# Patient Record
Sex: FEMALE | Race: WHITE | Employment: UNEMPLOYED | RURAL
[De-identification: names, ages, dates, MRNs, and addresses within clinical notes are randomized per-mention and may not be internally consistent; named-entity substitution may affect disease eponyms.]

---

## 2017-06-06 ENCOUNTER — OFFICE VISIT (OUTPATIENT)
Dept: FAMILY MEDICINE CLINIC | Age: 32
End: 2017-06-06

## 2017-06-06 VITALS
RESPIRATION RATE: 18 BRPM | HEIGHT: 65 IN | SYSTOLIC BLOOD PRESSURE: 119 MMHG | OXYGEN SATURATION: 99 % | HEART RATE: 85 BPM | WEIGHT: 112.6 LBS | TEMPERATURE: 98.1 F | DIASTOLIC BLOOD PRESSURE: 76 MMHG | BODY MASS INDEX: 18.76 KG/M2

## 2017-06-06 DIAGNOSIS — F11.23 OPIOID DEPENDENCE WITH WITHDRAWAL (HCC): Primary | ICD-10-CM

## 2017-06-06 RX ORDER — DICYCLOMINE HYDROCHLORIDE 10 MG/1
10 CAPSULE ORAL
Qty: 30 CAP | Refills: 0 | Status: SHIPPED | OUTPATIENT
Start: 2017-06-06 | End: 2017-08-18

## 2017-06-06 RX ORDER — CLONIDINE HYDROCHLORIDE 0.1 MG/1
0.1 TABLET ORAL
Qty: 30 TAB | Refills: 0 | Status: SHIPPED | OUTPATIENT
Start: 2017-06-06 | End: 2017-08-18

## 2017-06-06 RX ORDER — ST. JOHN'S WORT 300 MG
TABLET ORAL
COMMUNITY
End: 2022-08-30

## 2017-06-06 NOTE — PATIENT INSTRUCTIONS
For symptomatic relief:   - Diarrhea: may take Imodium 4 mg orally followed by 2 mg after each loose stool (maximum 16 mg daily)   - Abdominal cramping: may take Bentyl 10 mg every 6 hours as needed     Clonidine 0.1 mg tablet - may take every 6 hours as needed for withdrawal symptoms   - This is a blood pressure medication and therefore SHOULD NOT BE USED IF BLOOD PRESSURE IS LESS THAN 85/55 OR HEART RATE IS LESS THAN 60    You should contact [address] about their Adult Substance Abuse Program -   Intake Services: [phone]  Monday - Friday 8:30 a.m. - 5:00 p.m. CALL 911 or GO TO YOUR NEAREST EMERGENCY DEPARTMENT IF YOU ARE UNABLE TO MANAGE WITHDRAWAL SYMPTOMS AT HOME    Opioid Withdrawal: Care Instructions  Your Care Instructions  Opioids are strong pain medicines. Examples include hydrocodone, oxycodone, fentanyl, and morphine. Heroin is an example of an illegal opioid. Your body gets used to this type of drug if you take it all the time. This is called being dependent on the drug. And when you stop taking it, you go through withdrawal.  Withdrawal symptoms can include nausea, sweating, chills, diarrhea, stomach cramps, and muscle aches. Withdrawal can last up to several weeks, depending on which drug you took. You may feel very ill, but you are probably not in medical danger. Withdrawal isn't easy, but there are things you can do to help you cope with the symptoms. You will feel a little bit better each day as your body adjusts and heals itself. Follow-up care is a key part of your treatment and safety. Be sure to make and go to all appointments, and call your doctor if you are having problems. It's also a good idea to know your test results and keep a list of the medicines you take. How can you care for yourself at home? · Your doctor may give you medicine to help you feel better. Read and follow all instructions on the label.   · To help get through withdrawal, you can also:  ¨ Get plenty of rest.  ¨ Drink plenty of fluids. ¨ Stay active, but don't tire yourself. ¨ Eat a healthy diet. · Do not drink alcohol or take illegal drugs. · Talk to your doctor about drug counseling programs to help you stay drug-free. When should you call for help? Call 911 anytime you think you may need emergency care. For example, call if:  · You feel you cannot stop from hurting yourself or someone else. Call your doctor now or seek immediate medical care if:  · You have new or worse withdrawal symptoms that you can't manage at home, such as:  ¨ Stomach cramps. ¨ Vomiting. ¨ Diarrhea. ¨ Muscle aches. ¨ Sweating. Watch closely for changes in your health, and be sure to contact your doctor if:  · You do not get better as expected. Where can you learn more? Go to http://tammie-meredith.info/. Enter E242 in the search box to learn more about \"Opioid Withdrawal: Care Instructions. \"  Current as of: November 16, 2016  Content Version: 11.2  © 4033-8243 Healthwise, Incorporated. Care instructions adapted under license by [employer] (which disclaims liability or warranty for this information). If you have questions about a medical condition or this instruction, always ask your healthcare professional. [address] any warranty or liability for your use of this information.

## 2017-06-06 NOTE — PROGRESS NOTES
Subjective:      Rc Villegas is a 32 y.o. female here \"with withdrawal\". Reports the use of Percocet \"for years\" which was started after a surgical procedure. She reports that she has been getting medication from a family friend. She has been taking up to 6 tablets of Percocet  mg daily. Over the past few days, she has been tapering herself down. She has had 1.5 tablets of Percocet  mg today and this is the last of the medication that she has. She reports that she is ready to stop all together. She has has previously stopped medication in the past and had to do her withdrawal process at home. She reports that she has been told that she is not a candidate for methadone and has been told that she does not meet criteria for inpatient detoxification. She reports that she is sad, emotional, and has had loose stools. Current Outpatient Prescriptions   Medication Sig Dispense Refill    carli's wort 300 mg tab Take  by mouth.  multivitamin (ONE A DAY) tablet Take 1 Tab by mouth daily. Allergies   Allergen Reactions    Keflex [Cephalexin] Other (comments)     Mini seizure. Past Medical History:   Diagnosis Date    Anxiety     Bipolar affect, depressed     Eating disorder, anorexia nervosa 2006    Has been hospitalized for this in the past    Fibromyalgia     IBS (irritable bowel syndrome)     Kidney stones     Opiate addiction ([address] 2015    Per [facility] - Went to pain clinic    PTSD (post-traumatic stress disorder) 2015    Dx at [facility] 2015       Social History   Substance Use Topics    Smoking status: Former Smoker     Quit date: 2/15/2006    Smokeless tobacco: Never Used    Alcohol use No        Review of Systems  Pertinent items are noted in HPI.      Objective:     Visit Vitals    /76 (BP 1 Location: Left arm, BP Patient Position: Sitting)    Pulse 85    Temp 98.1 °F (36.7 °C) (Oral)    Resp 18    Ht 5' 5\" (1.651 m)    Wt 112 lb 9.6 oz (51.1 kg)    LMP 04/07/2017    SpO2 99%    BMI 18.74 kg/m2      General appearance - alert, tearful, no acute distress   Eyes - pupils equal and reactive, extraocular eye movements intact, sclera anicteric  Oropharyngx - mucous membranes moist, pharynx normal without lesions  Chest - clear to auscultation, no wheezes, rales or rhonchi, symmetric air entry, no tachypnea, retractions or cyanosis  Heart - normal rate, regular rhythm, normal S1, S2, no murmurs, rubs, clicks or gallops  Neurological - alert, oriented, normal speech, no focal findings or movement disorder noted    Assessment/Plan:   Danna Yost is a 32 y.o. female seen for:     1. Opioid dependence with withdrawal [facility]): has taken the last of her reported pills today. She has been through withdrawal process in the past per her report. Discussed that she needs to be seen by a substance abuse provider and information for [facility] provided. In the interim, will help with symptom control with Bentryl for abdominal cramping, Imodium for loose stools. Discussed use of clonidine for anxiety - advised that medication should not be used if BP < 88/55 or HR <60. Signs/symtpoms that would warrant ED evaluation were discussed. - cloNIDine HCl (CATAPRES) 0.1 mg tablet; Take 1 Tab by mouth every six (6) hours as needed. For withdrawal symptoms. Dispense: 30 Tab; Refill: 0  - dicyclomine (BENTYL) 10 mg capsule; Take 1 Cap by mouth four (4) times daily as needed. For abdominal cramping. Dispense: 30 Cap; Refill: 0    I have discussed the diagnosis with the patient and the intended plan as seen in the above orders. The patient has received an after-visit summary and questions were answered concerning future plans. I have discussed medication side effects and warnings with the patient as well. Patient verbalizes understanding of plan of care and denies further questions or concerns at this time.  Informed patient to return to the office if symptoms worsen or if new symptoms arise. Follow-up Disposition:  Return in about 2 days (around 6/8/2017) for follow up.

## 2017-06-06 NOTE — PROGRESS NOTES
Chief Complaint   Patient presents with    Anxiety     pt is wanting to wean herself off of Percocet.  Drug Problem     pt states she has been taking Percocet 60 mg daily. has been trying to taper down. last 3 days she changed to 10mg daily and today 15mg. \"REVIEWED RECORD IN PREPARATION FOR VISIT AND HAVE OBTAINED THE NECESSARY DOCUMENTATION\"  1. Have you been to the ER, urgent care clinic since your last visit? Hospitalized since your last visit? No    2. Have you seen or consulted any other health care providers outside of the [facility] since your last visit? Include any pap smears or colon screening. No  Patient does not have advanced directives.

## 2017-06-06 NOTE — MR AVS SNAPSHOT
Visit Information Date & Time Provider Department Dept. Phone Encounter #  
 6/6/2017  1:45 PM Hali JacksonAlfonso [phone] [id number] Follow-up Instructions Return in about 2 days (around 6/8/2017) for follow up. Upcoming Health Maintenance Date Due DTaP/Tdap/Td series (1 - Tdap) 11/16/2006 PAP AKA CERVICAL CYTOLOGY 11/16/2006 INFLUENZA AGE 9 TO ADULT 8/1/2017 Allergies as of 6/6/2017  Review Complete On: 6/6/2017 By: Hali Jackson MD  
  
 Severity Noted Reaction Type Reactions Keflex [Cephalexin] High 02/15/2010    Other (comments) Mini seizure. Current Immunizations  Never Reviewed No immunizations on file. Not reviewed this visit You Were Diagnosed With   
  
 Codes Comments [facility]io[facility]    -  Primary ICD-10-CM: F11.23 
ICD-9-CM: 292.0, 304.00 Vitals BP Pulse Temp Resp Height(growth percentile) Weight(growth percentile) 119/76 (BP 1 Location: Left arm, BP Patient Position: Sitting) 85 98.1 °F (36.7 °C) (Oral) 18 5' 5\" (1.651 m) 112 lb 9.6 oz (51.1 kg) LMP SpO2 BMI OB Status Smoking Status 04/07/2017 99% 18.74 kg/m2 Unknown Former Smoker BMI and BSA Data Body Mass Index Body Surface Area 18.74 kg/m 2 1.53 m 2 Preferred Pharmacy Pharmacy Name Phone [facility] [address] [place] [address] [address] [facility] [phone] Your Updated Medication List  
  
   
This list is accurate as of: 6/6/17  2:29 PM.  Always use your most recent med list.  
  
  
  
  
 cloNIDine HCl 0.1 mg tablet Commonly known as:  CATAPRES Take 1 Tab by mouth every six (6) hours as needed. For withdrawal symptoms. dicyclomine 10 mg capsule Commonly known as:  BENTYL Take 1 Cap by mouth four (4) times daily as needed. For abdominal cramping.  
  
 multivitamin tablet Commonly known as:  ONE A DAY Take 1 Tab by mouth daily. carli's wort 300 mg Tab Take  by mouth. Prescriptions Sent to Pharmacy Refills  
 cloNIDine HCl (CATAPRES) 0.1 mg tablet 0 Sig: Take 1 Tab by mouth every six (6) hours as needed. For withdrawal symptoms. Class: Normal  
 Pharmacy: [facility] [address] [place] - [address] AT [facility] of [address] ([facility] Ph #: [phone] Route: Oral  
 dicyclomine (BENTYL) 10 mg capsule 0 Sig: Take 1 Cap by mouth four (4) times daily as needed. For abdominal cramping. Class: Normal  
 Pharmacy: [facility] [address] [place] - [address] AT [facility] of [address] ([facility] Ph #: [phone] Route: Oral  
  
Follow-up Instructions Return in about 2 days (around 6/8/2017) for follow up. Patient Instructions For symptomatic relief: - Diarrhea: may take Imodium 4 mg orally followed by 2 mg after each loose stool (maximum 16 mg daily) - Abdominal cramping: may take Bentyl 10 mg every 6 hours as needed Clonidine 0.1 mg tablet - may take every 6 hours as needed for withdrawal symptoms - This is a blood pressure medication and therefore SHOULD NOT BE USED IF BLOOD PRESSURE IS LESS THAN 85/55 OR HEART RATE IS LESS THAN 60 You should contact [address] [facility] [address] about their Adult Substance Abuse Program - Intake Services: [phone] Monday  Friday 8:30 a.m.  5:00 p.m. CALL 911 or GO TO YOUR NEAREST EMERGENCY DEPARTMENT IF YOU ARE UNABLE TO MANAGE WITHDRAWAL SYMPTOMS AT HOME Opioid Withdrawal: Care Instructions Your Care Instructions Opioids are strong pain medicines. Examples include hydrocodone, oxycodone, fentanyl, and morphine. Heroin is an example of an illegal opioid. Your body gets used to this type of drug if you take it all the time. This is called being dependent on the drug.  And when you stop taking it, you go through withdrawal. 
Withdrawal symptoms can include nausea, sweating, chills, diarrhea, stomach cramps, and muscle aches. Withdrawal can last up to several weeks, depending on which drug you took. You may feel very ill, but you are probably not in medical danger. Withdrawal isn't easy, but there are things you can do to help you cope with the symptoms. You will feel a little bit better each day as your body adjusts and heals itself. Follow-up care is a key part of your treatment and safety. Be sure to make and go to all appointments, and call your doctor if you are having problems. It's also a good idea to know your test results and keep a list of the medicines you take. How can you care for yourself at home? · Your doctor may give you medicine to help you feel better. Read and follow all instructions on the label. · To help get through withdrawal, you can also: ¨ Get plenty of rest. 
¨ Drink plenty of fluids. ¨ Stay active, but don't tire yourself. ¨ Eat a healthy diet. · Do not drink alcohol or take illegal drugs. · Talk to your doctor about drug counseling programs to help you stay drug-free. When should you call for help? Call 911 anytime you think you may need emergency care. For example, call if: 
· You feel you cannot stop from hurting yourself or someone else. Call your doctor now or seek immediate medical care if: 
· You have new or worse withdrawal symptoms that you can't manage at home, such as: ¨ Stomach cramps. ¨ Vomiting. ¨ Diarrhea. ¨ Muscle aches. ¨ Sweating. Watch closely for changes in your health, and be sure to contact your doctor if: 
· You do not get better as expected. Where can you learn more? Go to http://tammie-meredith.info/. Enter E242 in the search box to learn more about \"Opioid Withdrawal: Care Instructions. \" Current as of: November 16, 2016 Content Version: 11.2 © 8420-1759 [employer].  Care instructions adapted under license by [address] (which disclaims liability or warranty for this information). If you have questions about a medical condition or this instruction, always ask your healthcare professional. [address] any warranty or liability for your use of this information. Introducing [facility]! [facility] introduces [employer] patient portal. Now you can access parts of your medical record, email your doctor's office, and request medication refills online. 1. In your internet browser, go to [email or web address] 2. Click on the First Time User? Click Here link in the Sign In box. You will see the New Member Sign Up page. 3. Enter your [employer] Access Code exactly as it appears below. You will not need to use this code after youve completed the sign-up process. If you do not sign up before the expiration date, you must request a new code. · [employer] Access Code: [email or web address] Expires: 9/4/2017  2:29 PM 
 
4. Enter the last four digits of your Social Security Number (xxxx) and Date of Birth (mm/dd/yyyy) as indicated and click Submit. You will be taken to the next sign-up page. 5. Create a Color Eight ID. This will be your Color Eight login ID and cannot be changed, so think of one that is secure and easy to remember. 6. Create a [employer] password. You can change your password at any time. 7. Enter your Password Reset Question and Answer. This can be used at a later time if you forget your password. 8. Enter your e-mail address. You will receive e-mail notification when new information is available in [address] 9. Click Sign Up. You can now view and download portions of your medical record. 10. Click the Download Summary menu link to download a portable copy of your medical information. If you have questions, please visit the Frequently Asked Questions section of the [employer] website.  Remember, [employer] is NOT to be used for urgent needs. For medical emergencies, dial 911. Now available from your iPhone and Android! Please provide this summary of care documentation to your next provider. Your primary care clinician is listed as [address]. If you have any questions after today's visit, please call [phone].

## 2017-08-18 ENCOUNTER — OFFICE VISIT (OUTPATIENT)
Dept: FAMILY MEDICINE CLINIC | Age: 32
End: 2017-08-18

## 2017-08-18 VITALS
TEMPERATURE: 98.7 F | WEIGHT: 113 LBS | DIASTOLIC BLOOD PRESSURE: 88 MMHG | HEIGHT: 65 IN | BODY MASS INDEX: 18.83 KG/M2 | OXYGEN SATURATION: 100 % | RESPIRATION RATE: 18 BRPM | SYSTOLIC BLOOD PRESSURE: 125 MMHG | HEART RATE: 56 BPM

## 2017-08-18 DIAGNOSIS — Z00.00 ROUTINE MEDICAL EXAM: Primary | ICD-10-CM

## 2017-08-18 DIAGNOSIS — F11.21 OPIOID DEPENDENCE IN EARLY, EARLY PARTIAL, SUSTAINED FULL, OR SUSTAINED PARTIAL REMISSION (HCC): ICD-10-CM

## 2017-08-18 DIAGNOSIS — N91.2 AMENORRHEA: ICD-10-CM

## 2017-08-18 DIAGNOSIS — F41.9 ANXIETY: ICD-10-CM

## 2017-08-18 RX ORDER — HYDROXYZINE 50 MG/1
50 TABLET, FILM COATED ORAL
Qty: 90 TAB | Refills: 1 | Status: SHIPPED | OUTPATIENT
Start: 2017-08-18 | End: 2020-08-18 | Stop reason: ALTCHOICE

## 2017-08-18 NOTE — PROGRESS NOTES
Subjective:      Libby Ward is a 32 y.o. female here today for her annual physical exam.     Health Maintenance:  · Cervical cancer screening: previously done at [facility]. · Tetanus: unknown    Additional concerns:   - Opioid dependency: She is in outpatient therapy through [facility]. She goes to group once per week. She goes to substance abuse counselor at least 1-2 times per week. She reports that she did use 1/2 Percocet last week. She reports that she is having difficulty with dealing the the stress and anxiety of living with the pain. She has taken hydroxyzine in the past with benefit. No LMP recorded. Current Outpatient Prescriptions   Medication Sig Dispense Refill    carli's wort 300 mg tab Take  by mouth.  multivitamin (ONE A DAY) tablet Take 1 Tab by mouth daily. Allergies   Allergen Reactions    Keflex [Cephalexin] Other (comments)     Mini seizure.         Past Medical History:   Diagnosis Date    Anxiety     Bipolar affect, depressed     Eating disorder, anorexia nervosa 2006    Has been hospitalized for this in the past    Fibromyalgia     IBS (irritable bowel syndrome)     Kidney stones     [facility]ate [facility] ([facility] [address] 2015    Per [facility] - Went to pain clinic    PTSD (post-traumatic stress disorder) 2015    Dx at [facility] 2015       Social History   Substance Use Topics    Smoking status: Former Smoker     Quit date: 2/15/2006    Smokeless tobacco: Never Used    Alcohol use No        Review of Systems  Constitutional: negative for fevers and chills  Eyes: negative for visual disturbance and irritation  Ears, nose, mouth, throat, and face: negative for nasal congestion and sore throat  Respiratory: negative for cough, sputum or dyspnea on exertion  Cardiovascular: negative for chest pain, chest pressure/discomfort, palpitations, irregular heart beats, lower extremity edema  Gastrointestinal: negative for nausea, vomiting, change in bowel habits and abdominal pain  Genitourinary:negative for frequency, dysuria and hematuria  Neurological: negative for headaches, dizziness and paresthesia     Objective:     Visit Vitals    /88 (BP 1 Location: Right arm, BP Patient Position: Sitting)    Pulse (!) 56    Temp 98.7 °F (37.1 °C) (Oral)    Resp 18    Ht 5' 5\" (1.651 m)    Wt 113 lb (51.3 kg)    SpO2 100%    BMI 18.8 kg/m2      General appearance - alert, well appearing, and in no distress  Eyes - pupils equal and reactive, extraocular eye movements intact, sclera anicteric  Oropharyngx - mucous membranes moist, pharynx normal without lesions  Neck - supple, no significant adenopathy, thyroid exam: thyroid is normal in size without nodules or tenderness  Chest - clear to auscultation, no wheezes, rales or rhonchi, symmetric air entry, no tachypnea, retractions or cyanosis  Heart - normal rate, regular rhythm, normal S1, S2, no murmurs, rubs, clicks or gallops  Abdomen - soft, nontender, nondistended, no masses or organomegaly  Neurological - alert, oriented, normal speech, no focal findings or movement disorder noted  Extremities - peripheral pulses normal, no pedal edema, no clubbing or cyanosis      Assessment/Plan:   Shira Beal is a 32 y.o. female seen for:     1. Routine medical exam: healthy. Routine gynecological care provided at [address] [place] [address] and she is encouraged to follow up. 2. Anxiety: will start hydroxyzine therapy. Feel as though she needs Psychiatry evaluation and hopefully she will be able to be seen at [facility]. - hydrOXYzine HCl (ATARAX) 50 mg tablet; Take 1 Tab by mouth three (3) times daily as needed for Anxiety. Dispense: 90 Tab; Refill: 1    3. Opioid dependence in early, early partial, sustained full, or sustained partial remission [facility]): currently in outpatient substance abuse therapy through [facility]. 4. Amenorrhea: reports full work up with her Gynecologist whom she is encouraged to see.     I have discussed the diagnosis with the patient and the intended plan as seen in the above orders. The patient has received an after-visit summary and questions were answered concerning future plans. I have discussed medication side effects and warnings with the patient as well. Patient verbalizes understanding of plan of care and denies further questions or concerns at this time. Informed patient to return to the office if symptoms worsen or if new symptoms arise. Follow-up Disposition:  Return if symptoms worsen or fail to improve.

## 2017-08-18 NOTE — PATIENT INSTRUCTIONS

## 2017-08-18 NOTE — PROGRESS NOTES
Identified pt with two pt identifiers(name and ). Chief Complaint   Patient presents with    Medication Problem     Addicted to percecet Off for two months still going through withdrawl and pain. broke down and had a 1/2 pill last week. Wants help    Menstrual Problem     Had first cycle in a year 2 months sgo. Hasnt had one since. Health Maintenance Due   Topic    DTaP/Tdap/Td series (1 - Tdap)    PAP AKA CERVICAL CYTOLOGY     INFLUENZA AGE 9 TO ADULT        Wt Readings from Last 3 Encounters:   17 113 lb (51.3 kg)   17 112 lb 9.6 oz (51.1 kg)   16 111 lb (50.3 kg)     Temp Readings from Last 3 Encounters:   17 98.7 °F (37.1 °C) (Oral)   17 98.1 °F (36.7 °C) (Oral)   16 98.2 °F (36.8 °C) (Oral)     BP Readings from Last 3 Encounters:   17 125/88   17 119/76   16 103/67     Pulse Readings from Last 3 Encounters:   17 (!) 56   17 85   16 65         Learning Assessment:  :     Learning Assessment 4/15/2016   PRIMARY LEARNER Patient   HIGHEST LEVEL OF EDUCATION - PRIMARY LEARNER  GRADUATED HIGH SCHOOL OR GED   BARRIERS PRIMARY LEARNER NONE   CO-LEARNER CAREGIVER No   PRIMARY LANGUAGE ENGLISH   LEARNER PREFERENCE PRIMARY DEMONSTRATION     READING     LISTENING   ANSWERED BY patient   RELATIONSHIP SELF       Depression Screening:  :     PHQ over the last two weeks 2017   Little interest or pleasure in doing things Not at all   Feeling down, depressed or hopeless Not at all   Total Score PHQ 2 0       Fall Risk Assessment:  :     No flowsheet data found. Abuse Screening:  :     Abuse Screening Questionnaire 2017   Do you ever feel afraid of your partner? N   Are you in a relationship with someone who physically or mentally threatens you? N   Is it safe for you to go home?  Y       Coordination of Care Questionnaire:  :     1) Have you been to an emergency room, urgent care clinic since your last visit? no   Hospitalized since your last visit? no             2) Have you seen or consulted any other health care providers outside of [address] [place] [address] since your last visit? yes [address]   (Include any pap smears or colon screenings in this section.)    3) Do you have an Advance Directive on file? no  Are you interested in receiving information about Advance Directives? no    Reviewed record in preparation for visit and have obtained necessary documentation. Medication reconciliation up to date and corrected with patient at this time.

## 2017-08-18 NOTE — MR AVS SNAPSHOT
Visit Information Date & Time Provider Department Dept. Phone Encounter #  
 8/18/2017  8:15 AM Noelle SarahAlfonso [phone] [id number] Follow-up Instructions Return if symptoms worsen or fail to improve. Upcoming Health Maintenance Date Due DTaP/Tdap/Td series (1 - Tdap) 11/16/2006 PAP AKA CERVICAL CYTOLOGY 11/16/2006 INFLUENZA AGE 9 TO ADULT 8/1/2017 Allergies as of 8/18/2017  Review Complete On: 8/18/2017 By: Noelle Sarah MD  
  
 Severity Noted Reaction Type Reactions Keflex [Cephalexin] High 02/15/2010    Other (comments) Mini seizure. Current Immunizations  Never Reviewed No immunizations on file. Not reviewed this visit You Were Diagnosed With   
  
 Codes Comments Routine medical exam    -  Primary ICD-10-CM: Z00.00 ICD-9-CM: V70.0 Anxiety     ICD-10-CM: F41.9 ICD-9-CM: 300.00 Opioid dependence in early, early partial, sustained full, or sustained partial remission ([facility]     ICD-10-CM: F11.21 
ICD-9-CM: 304.03 Amenorrhea     ICD-10-CM: N91.2 ICD-9-CM: 626.0 Vitals BP Pulse Temp Resp Height(growth percentile) Weight(growth percentile) 125/88 (BP 1 Location: Right arm, BP Patient Position: Sitting) (!) 56 98.7 °F (37.1 °C) (Oral) 18 5' 5\" (1.651 m) 113 lb (51.3 kg) SpO2 BMI OB Status Smoking Status 100% 18.8 kg/m2 Unknown Former Smoker BMI and BSA Data Body Mass Index Body Surface Area  
 18.8 kg/m 2 1.53 m 2 Preferred Pharmacy Pharmacy Name Phone [facility] [address] AT [address] [facility] [phone] Your Updated Medication List  
  
   
This list is accurate as of: 8/18/17  9:06 AM.  Always use your most recent med list.  
  
  
  
  
 hydrOXYzine HCl 50 mg tablet Commonly known as:  ATARAX Take 1 Tab by mouth three (3) times daily as needed for Anxiety. multivitamin tablet Commonly known as:  ONE A DAY Take 1 Tab by mouth daily. carli's wort 300 mg Tab Take  by mouth. Prescriptions Sent to Pharmacy Refills  
 hydrOXYzine HCl (ATARAX) 50 mg tablet 1 Sig: Take 1 Tab by mouth three (3) times daily as needed for Anxiety. Class: Normal  
 Pharmacy: [facility]  [address], [place] - [address] AT [facility] [address] ([facility] #: [phone] Route: Oral  
  
Follow-up Instructions Return if symptoms worsen or fail to improve. Patient Instructions Anxiety Disorder: Care Instructions Your Care Instructions Anxiety is a normal reaction to stress. Difficult situations can cause you to have symptoms such as sweaty palms and a nervous feeling. In an anxiety disorder, the symptoms are far more severe. Constant worry, muscle tension, trouble sleeping, nausea and diarrhea, and other symptoms can make normal daily activities difficult or impossible. These symptoms may occur for no reason, and they can affect your work, school, or social life. Medicines, counseling, and self-care can all help. Follow-up care is a key part of your treatment and safety. Be sure to make and go to all appointments, and call your doctor if you are having problems. It's also a good idea to know your test results and keep a list of the medicines you take. How can you care for yourself at home? · Take medicines exactly as directed. Call your doctor if you think you are having a problem with your medicine. · Go to your counseling sessions and follow-up appointments. · Recognize and accept your anxiety. Then, when you are in a situation that makes you anxious, say to yourself, \"This is not an emergency. I feel uncomfortable, but I am not in danger. I can keep going even if I feel anxious. \" · Be kind to your body: ¨ Relieve tension with exercise or a massage.  
¨ Get enough rest. 
 ¨ Avoid alcohol, caffeine, nicotine, and illegal drugs. They can increase your anxiety level and cause sleep problems. ¨ Learn and do relaxation techniques. See below for more about these techniques. · Engage your mind. Get out and do something you enjoy. Go to a funny movie, or take a walk or hike. Plan your day. Having too much or too little to do can make you anxious. · Keep a record of your symptoms. Discuss your fears with a good friend or family member, or join a support group for people with similar problems. Talking to others sometimes relieves stress. · Get involved in social groups, or volunteer to help others. Being alone sometimes makes things seem worse than they are. · Get at least 30 minutes of exercise on most days of the week to relieve stress. Walking is a good choice. You also may want to do other activities, such as running, swimming, cycling, or playing tennis or team sports. Relaxation techniques Do relaxation exercises 10 to 20 minutes a day. You can play soothing, relaxing music while you do them, if you wish. · Tell others in your house that you are going to do your relaxation exercises. Ask them not to disturb you. · Find a comfortable place, away from all distractions and noise. · Lie down on your back, or sit with your back straight. · Focus on your breathing. Make it slow and steady. · Breathe in through your nose. Breathe out through either your nose or mouth. · Breathe deeply, filling up the area between your navel and your rib cage. Breathe so that your belly goes up and down. · Do not hold your breath. · Breathe like this for 5 to 10 minutes. Notice the feeling of calmness throughout your whole body. As you continue to breathe slowly and deeply, relax by doing the following for another 5 to 10 minutes: · Tighten and relax each muscle group in your body. You can begin at your toes and work your way up to your head. · Imagine your muscle groups relaxing and becoming heavy. · Empty your mind of all thoughts. · Let yourself relax more and more deeply. · Become aware of the state of calmness that surrounds you. · When your relaxation time is over, you can bring yourself back to alertness by moving your fingers and toes and then your hands and feet and then stretching and moving your entire body. Sometimes people fall asleep during relaxation, but they usually wake up shortly afterward. · Always give yourself time to return to full alertness before you drive a car or do anything that might cause an accident if you are not fully alert. Never play a relaxation tape while you drive a car. When should you call for help? Call 911 anytime you think you may need emergency care. For example, call if: 
· You feel you cannot stop from hurting yourself or someone else. Keep the numbers for these national suicide hotlines: 8-738-574-TALK (5-211.954.4972) and 5-312-BGRKGLB (1-452.938.5627). If you or someone you know talks about suicide or feeling hopeless, get help right away. Watch closely for changes in your health, and be sure to contact your doctor if: 
· You have anxiety or fear that affects your life. · You have symptoms of anxiety that are new or different from those you had before. Where can you learn more? Go to http://tammie-meredith.info/. Enter P754 in the search box to learn more about \"Anxiety Disorder: Care Instructions. \" Current as of: July 26, 2016 Content Version: 11.3 © 6081-1833 Healthwise, Incorporated. Care instructions adapted under license by [employer] (which disclaims liability or warranty for this information). If you have questions about a medical condition or this instruction, always ask your healthcare professional. [address] any warranty or liability for your use of this information. Introducing \[facility]\"" & HEALTH SERVICES! César broderick yoonew patient portal. Now you can access parts of your medical record, email your doctor's office, and request medication refills online. 1. In your internet browser, go to [email or web address] 2. Click on the First Time User? Click Here link in the Sign In box. You will see the New Member Sign Up page. 3. Enter your yoonew Access Code exactly as it appears below. You will not need to use this code after youve completed the sign-up process. If you do not sign up before the expiration date, you must request a new code. · yoonew Access Code: [email or web address] Expires: 9/4/2017  2:29 PM 
 
4. Enter the last four digits of your Social Security Number (xxxx) and Date of Birth (mm/dd/yyyy) as indicated and click Submit. You will be taken to the next sign-up page. 5. Create a yoonew ID. This will be your yoonew login ID and cannot be changed, so think of one that is secure and easy to remember. 6. Create a yoonew password. You can change your password at any time. 7. Enter your Password Reset Question and Answer. This can be used at a later time if you forget your password. 8. Enter your e-mail address. You will receive e-mail notification when new information is available in [address] 9. Click Sign Up. You can now view and download portions of your medical record. 10. Click the Download Summary menu link to download a portable copy of your medical information. If you have questions, please visit the Frequently Asked Questions section of the yoonew website. Remember, yoonew is NOT to be used for urgent needs. For medical emergencies, dial 911. Now available from your iPhone and Android! Please provide this summary of care documentation to your next provider. Your primary care clinician is listed as [address]. If you have any questions after today's visit, please call [phone].

## 2018-01-19 PROCEDURE — 99283 EMERGENCY DEPT VISIT LOW MDM: CPT

## 2018-01-20 ENCOUNTER — HOSPITAL ENCOUNTER (EMERGENCY)
Age: 33
Discharge: HOME | End: 2018-01-20
Attending: EMERGENCY MEDICINE
Payer: SELF-PAY

## 2018-01-20 VITALS
HEART RATE: 77 BPM | SYSTOLIC BLOOD PRESSURE: 113 MMHG | OXYGEN SATURATION: 100 % | DIASTOLIC BLOOD PRESSURE: 66 MMHG | TEMPERATURE: 97.4 F | RESPIRATION RATE: 18 BRPM | HEIGHT: 65 IN | WEIGHT: 118.17 LBS | BODY MASS INDEX: 19.69 KG/M2

## 2018-01-20 DIAGNOSIS — F12.90 MARIJUANA USE, CONTINUOUS: ICD-10-CM

## 2018-01-20 DIAGNOSIS — R11.2 NAUSEA AND VOMITING IN ADULT: ICD-10-CM

## 2018-01-20 DIAGNOSIS — R10.13 ABDOMINAL PAIN, EPIGASTRIC: Primary | ICD-10-CM

## 2018-01-20 LAB
ALBUMIN SERPL-MCNC: 4.7 G/DL (ref 3.5–5)
ALBUMIN/GLOB SERPL: 1.4 {RATIO} (ref 1.1–2.2)
ALP SERPL-CCNC: 72 U/L (ref 45–117)
ALT SERPL-CCNC: 20 U/L (ref 12–78)
ANION GAP SERPL CALC-SCNC: 12 MMOL/L (ref 5–15)
APPEARANCE UR: ABNORMAL
AST SERPL-CCNC: 21 U/L (ref 15–37)
BACTERIA URNS QL MICRO: ABNORMAL /HPF
BASOPHILS # BLD: 0 K/UL (ref 0–0.1)
BASOPHILS NFR BLD: 0 % (ref 0–1)
BILIRUB SERPL-MCNC: 0.3 MG/DL (ref 0.2–1)
BILIRUB UR QL: NEGATIVE
BUN SERPL-MCNC: 13 MG/DL (ref 6–20)
BUN/CREAT SERPL: 17 (ref 12–20)
CALCIUM SERPL-MCNC: 9.7 MG/DL (ref 8.5–10.1)
CHLORIDE SERPL-SCNC: 99 MMOL/L (ref 97–108)
CO2 SERPL-SCNC: 28 MMOL/L (ref 21–32)
COLOR UR: ABNORMAL
CREAT SERPL-MCNC: 0.78 MG/DL (ref 0.55–1.02)
EOSINOPHIL # BLD: 0 K/UL (ref 0–0.4)
EOSINOPHIL NFR BLD: 0 % (ref 0–7)
EPITH CASTS URNS QL MICRO: ABNORMAL /LPF
ERYTHROCYTE [DISTWIDTH] IN BLOOD BY AUTOMATED COUNT: 12 % (ref 11.5–14.5)
GLOBULIN SER CALC-MCNC: 3.3 G/DL (ref 2–4)
GLUCOSE SERPL-MCNC: 132 MG/DL (ref 65–100)
GLUCOSE UR STRIP.AUTO-MCNC: NEGATIVE MG/DL
HCG UR QL: NEGATIVE
HCT VFR BLD AUTO: 37.3 % (ref 35–47)
HGB BLD-MCNC: 13.5 G/DL (ref 11.5–16)
HGB UR QL STRIP: NEGATIVE
KETONES UR QL STRIP.AUTO: 15 MG/DL
LEUKOCYTE ESTERASE UR QL STRIP.AUTO: ABNORMAL
LIPASE SERPL-CCNC: 152 U/L (ref 73–393)
LYMPHOCYTES # BLD: 1.2 K/UL (ref 0.8–3.5)
LYMPHOCYTES NFR BLD: 14 % (ref 12–49)
MAGNESIUM SERPL-MCNC: 1.7 MG/DL (ref 1.6–2.4)
MCH RBC QN AUTO: 30.4 PG (ref 26–34)
MCHC RBC AUTO-ENTMCNC: 36.2 G/DL (ref 30–36.5)
MCV RBC AUTO: 84 FL (ref 80–99)
MONOCYTES # BLD: 0.3 K/UL (ref 0–1)
MONOCYTES NFR BLD: 4 % (ref 5–13)
NEUTS SEG # BLD: 7.3 K/UL (ref 1.8–8)
NEUTS SEG NFR BLD: 82 % (ref 32–75)
NITRITE UR QL STRIP.AUTO: NEGATIVE
PH UR STRIP: 8.5 [PH] (ref 5–8)
PLATELET # BLD AUTO: 190 K/UL (ref 150–400)
POTASSIUM SERPL-SCNC: 3.3 MMOL/L (ref 3.5–5.1)
PROT SERPL-MCNC: 8 G/DL (ref 6.4–8.2)
PROT UR STRIP-MCNC: ABNORMAL MG/DL
RBC # BLD AUTO: 4.44 M/UL (ref 3.8–5.2)
RBC #/AREA URNS HPF: ABNORMAL /HPF (ref 0–5)
SODIUM SERPL-SCNC: 139 MMOL/L (ref 136–145)
UA: UC IF INDICATED,UAUC: ABNORMAL
UROBILINOGEN UR QL STRIP.AUTO: 0.2 EU/DL (ref 0.2–1)
WBC # BLD AUTO: 8.9 K/UL (ref 3.6–11)
WBC URNS QL MICRO: ABNORMAL /HPF (ref 0–4)

## 2018-01-20 PROCEDURE — 74011250636 HC RX REV CODE- 250/636: Performed by: EMERGENCY MEDICINE

## 2018-01-20 PROCEDURE — 80053 COMPREHEN METABOLIC PANEL: CPT | Performed by: PHYSICIAN ASSISTANT

## 2018-01-20 PROCEDURE — 96361 HYDRATE IV INFUSION ADD-ON: CPT

## 2018-01-20 PROCEDURE — 85025 COMPLETE CBC W/AUTO DIFF WBC: CPT | Performed by: PHYSICIAN ASSISTANT

## 2018-01-20 PROCEDURE — 74011000250 HC RX REV CODE- 250: Performed by: PHYSICIAN ASSISTANT

## 2018-01-20 PROCEDURE — 81025 URINE PREGNANCY TEST: CPT

## 2018-01-20 PROCEDURE — 81001 URINALYSIS AUTO W/SCOPE: CPT | Performed by: EMERGENCY MEDICINE

## 2018-01-20 PROCEDURE — 87086 URINE CULTURE/COLONY COUNT: CPT | Performed by: EMERGENCY MEDICINE

## 2018-01-20 PROCEDURE — 83690 ASSAY OF LIPASE: CPT | Performed by: PHYSICIAN ASSISTANT

## 2018-01-20 PROCEDURE — 96375 TX/PRO/DX INJ NEW DRUG ADDON: CPT

## 2018-01-20 PROCEDURE — 96374 THER/PROPH/DIAG INJ IV PUSH: CPT

## 2018-01-20 PROCEDURE — 83735 ASSAY OF MAGNESIUM: CPT | Performed by: PHYSICIAN ASSISTANT

## 2018-01-20 PROCEDURE — 74011250636 HC RX REV CODE- 250/636: Performed by: PHYSICIAN ASSISTANT

## 2018-01-20 RX ORDER — PROCHLORPERAZINE 25 MG
25 SUPPOSITORY, RECTAL RECTAL
Qty: 10 SUPPOSITORY | Refills: 0 | Status: SHIPPED | OUTPATIENT
Start: 2018-01-20 | End: 2018-01-27

## 2018-01-20 RX ORDER — DIPHENHYDRAMINE HYDROCHLORIDE 50 MG/ML
50 INJECTION, SOLUTION INTRAMUSCULAR; INTRAVENOUS
Status: COMPLETED | OUTPATIENT
Start: 2018-01-20 | End: 2018-01-20

## 2018-01-20 RX ORDER — FAMOTIDINE 20 MG/1
20 TABLET, FILM COATED ORAL 2 TIMES DAILY
Qty: 20 TAB | Refills: 0 | Status: SHIPPED | OUTPATIENT
Start: 2018-01-20 | End: 2020-08-18 | Stop reason: ALTCHOICE

## 2018-01-20 RX ORDER — PROCHLORPERAZINE EDISYLATE 5 MG/ML
10 INJECTION INTRAMUSCULAR; INTRAVENOUS
Status: COMPLETED | OUTPATIENT
Start: 2018-01-20 | End: 2018-01-20

## 2018-01-20 RX ORDER — ONDANSETRON 2 MG/ML
4 INJECTION INTRAMUSCULAR; INTRAVENOUS
Status: COMPLETED | OUTPATIENT
Start: 2018-01-20 | End: 2018-01-20

## 2018-01-20 RX ADMIN — SODIUM CHLORIDE 1000 ML: 9 INJECTION, SOLUTION INTRAVENOUS at 00:55

## 2018-01-20 RX ADMIN — DIPHENHYDRAMINE HYDROCHLORIDE 50 MG: 50 INJECTION, SOLUTION INTRAMUSCULAR; INTRAVENOUS at 00:55

## 2018-01-20 RX ADMIN — ONDANSETRON 4 MG: 2 INJECTION INTRAMUSCULAR; INTRAVENOUS at 00:41

## 2018-01-20 RX ADMIN — PROCHLORPERAZINE EDISYLATE 10 MG: 5 INJECTION INTRAMUSCULAR; INTRAVENOUS at 00:55

## 2018-01-20 RX ADMIN — FAMOTIDINE 20 MG: 10 INJECTION, SOLUTION INTRAVENOUS at 00:55

## 2018-01-20 NOTE — ED PROVIDER NOTES
HPI Comments: 28 y.o. female with past medical history significant for bipolar affect, anxiety, anorexia, fibromyalgia, IBS, kidney stones, PTSD, and opiate addiction who presents accompanied by her mother with chief complaint of abdominal pain. The pt c/o abdominal pain with associated nausea and vomiting. The pt indicates approximately 6 episodes of vomiting today. The pt says that her abdominal pain is so severe that she \"can't stand it\" and she has \"nothing left to vomit. \" She describes a horrible burning sensation that radiates into her chest and throat that is worse with vomiting. The pt admits that she had similar, less severe symptoms in the past. Denies diarrhea, fever, chills, urinary symptoms, and SOB. There are no other acute medical concerns at this time. Social hx: nonsmoker. Social ETOH use. Current, daily marijuana use. PCP: Ron Viramontes MD    Note written by Екатерина Byers, as dictated by Dima Jeff PA-C 12:35 AM     The history is provided by the patient, the spouse and a parent. No  was used.         Past Medical History:   Diagnosis Date    Anxiety     Bipolar affect, depressed     Eating disorder, anorexia nervosa 2006    Has been hospitalized for this in the past    Fibromyalgia     IBS (irritable bowel syndrome)     Kidney stones     Opiate addiction ([facility] [address] 2015    Per [facility] - Went to pain clinic    PTSD (post-traumatic stress disorder) 2015    Dx at [facility] 2015       Past Surgical History:   Procedure Laterality Date    HX LEEP PROCEDURE  2015    precancerous cells    HX TONSIL AND ADENOIDECTOMY      HX WISDOM TEETH EXTRACTION           Family History:   Problem Relation Age of Onset    Psychiatric Disorder Mother     Elevated Lipids Mother     Psychiatric Disorder Sister     SLE Paternal Grandmother        Social History     Social History    Marital status:      Spouse name: N/A    Number of children: N/A    Years of education: N/A Occupational History    Not on file. Social History Main Topics    Smoking status: Former Smoker     Quit date: 2/15/2006    Smokeless tobacco: Never Used    Alcohol use No    Drug use: No      Comment: Has used marijuana in the past    Sexual activity: Yes     Partners: Male     Birth control/ protection: None     Other Topics Concern    Not on file     Social History Narrative         ALLERGIES: Keflex [cephalexin]    Review of Systems   Constitutional: Negative for appetite change, chills, fatigue and fever. HENT: Negative for congestion, ear pain, postnasal drip, rhinorrhea and sore throat. Eyes: Negative for visual disturbance. Respiratory: Negative for cough, shortness of breath and wheezing. Cardiovascular: Negative for chest pain, palpitations and leg swelling. Gastrointestinal: Positive for abdominal pain, nausea and vomiting. Negative for anal bleeding, constipation and diarrhea. Genitourinary: Negative for dysuria and hematuria. Musculoskeletal: Negative for arthralgias and myalgias. Skin: Negative for rash. Allergic/Immunologic: Negative for immunocompromised state. Neurological: Negative for weakness, light-headedness and headaches. Vitals:    01/19/18 2324 01/20/18 0348   BP: 146/75 113/66   Pulse: 77    Resp: 18    Temp: 97.4 °F (36.3 °C)    SpO2: 100%    Weight: 53.6 kg (118 lb 2.7 oz)    Height: 5' 5\" (1.651 m)             Physical Exam   Constitutional: She is oriented to person, place, and time. She appears well-developed and well-nourished. No distress. Ill, but non toxic appearing   HENT:   Head: Normocephalic and atraumatic. Right Ear: External ear normal.   Left Ear: External ear normal.   Eyes: EOM are normal. Pupils are equal, round, and reactive to light. Neck: Neck supple. Cardiovascular: Normal rate, regular rhythm, normal heart sounds and intact distal pulses. Exam reveals no gallop and no friction rub.     No murmur heard.  Pulmonary/Chest: Effort normal and breath sounds normal. No stridor. No respiratory distress. She has no wheezes. She has no rales. She exhibits no tenderness. Abdominal: Soft. Bowel sounds are normal. She exhibits no distension and no mass. There is tenderness. There is no rebound and no guarding. Diffuse abd TTP more focal to epigastric areas without rebounding or guarding. No cvat   Musculoskeletal: Normal range of motion. She exhibits no edema, tenderness or deformity. Neurological: She is alert and oriented to person, place, and time. No cranial nerve deficit. Coordination normal.   Skin: No rash noted. No erythema. There is pallor. Psychiatric: She has a normal mood and affect. Her behavior is normal.   Nursing note and vitals reviewed. MDM  Number of Diagnoses or Management Options  Abdominal pain, epigastric:   Marijuana use, continuous:   Nausea and vomiting in adult:      Amount and/or Complexity of Data Reviewed  Clinical lab tests: ordered and reviewed  Tests in the medicine section of CPT®: reviewed and ordered  Obtain history from someone other than the patient: yes (, mother)  Review and summarize past medical records: yes  Independent visualization of images, tracings, or specimens: yes    Patient Progress  Patient progress: stable    ED Course       Procedures  12:44 AM  Discussed with the patient the medical risks of prolonged smoking habits (THC) and advised the patient of the benefits of the cessation of smoking. The patient verbalized their understanding. Andrea Echevarria PA-C    12:44 AM  Discussed pt, sx, hx and current findings with Dr Jeovanny Cartagena. He is in agreement with plan. Will get abd labs and urine. Will give fluids and medicine for sx. Andrea Echevarria PA-C    3:35 PM   pt feeling better. Tolerating pos. Will discharge  Andrea Echevarria PA-C    LABORATORY TESTS:  Recent Results (from the past 12 hour(s))   URINALYSIS W/ REFLEX CULTURE    Collection Time: 01/20/18 12:23 AM   Result Value Ref Range    Color YELLOW/STRAW      Appearance CLOUDY (A) CLEAR      pH (UA) 8.5 (H) 5.0 - 8.0      Protein TRACE (A) NEG mg/dL    Glucose NEGATIVE  NEG mg/dL    Ketone 15 (A) NEG mg/dL    Bilirubin NEGATIVE  NEG      Blood NEGATIVE  NEG      Urobilinogen 0.2 0.2 - 1.0 EU/dL    Nitrites NEGATIVE  NEG      Leukocyte Esterase TRACE (A) NEG      WBC 5-10 0 - 4 /hpf    RBC 0-5 0 - 5 /hpf    Epithelial cells MODERATE (A) FEW /lpf    Bacteria 2+ (A) NEG /hpf    UA:UC IF INDICATED URINE CULTURE ORDERED (A) CNI     HCG URINE, QL. - POC    Collection Time: 01/20/18 12:23 AM   Result Value Ref Range    Pregnancy test,urine (POC) NEGATIVE  NEG     CBC WITH AUTOMATED DIFF    Collection Time: 01/20/18 12:40 AM   Result Value Ref Range    WBC 8.9 3.6 - 11.0 K/uL    RBC 4.44 3.80 - 5.20 M/uL    HGB 13.5 11.5 - 16.0 g/dL    HCT 37.3 35.0 - 47.0 %    MCV 84.0 80.0 - 99.0 FL    MCH 30.4 26.0 - 34.0 PG    MCHC 36.2 30.0 - 36.5 g/dL    RDW 12.0 11.5 - 14.5 %    PLATELET 500 244 - 078 K/uL    NEUTROPHILS 82 (H) 32 - 75 %    LYMPHOCYTES 14 12 - 49 %    MONOCYTES 4 (L) 5 - 13 %    EOSINOPHILS 0 0 - 7 %    BASOPHILS 0 0 - 1 %    ABS. NEUTROPHILS 7.3 1.8 - 8.0 K/UL    ABS. LYMPHOCYTES 1.2 0.8 - 3.5 K/UL    ABS. MONOCYTES 0.3 0.0 - 1.0 K/UL    ABS. EOSINOPHILS 0.0 0.0 - 0.4 K/UL    ABS. BASOPHILS 0.0 0.0 - 0.1 K/UL   METABOLIC PANEL, COMPREHENSIVE    Collection Time: 01/20/18 12:40 AM   Result Value Ref Range    Sodium 139 136 - 145 mmol/L    Potassium 3.3 (L) 3.5 - 5.1 mmol/L    Chloride 99 97 - 108 mmol/L    CO2 28 21 - 32 mmol/L    Anion gap 12 5 - 15 mmol/L    Glucose 132 (H) 65 - 100 mg/dL    BUN 13 6 - 20 MG/DL    Creatinine 0.78 0.55 - 1.02 MG/DL    BUN/Creatinine ratio 17 12 - 20      GFR est AA >60 >60 ml/min/1.73m2    GFR est non-AA >60 >60 ml/min/1.73m2    Calcium 9.7 8.5 - 10.1 MG/DL    Bilirubin, total 0.3 0.2 - 1.0 MG/DL    ALT (SGPT) 20 12 - 78 U/L    AST (SGOT) 21 15 - 37 U/L    Alk. phosphatase 72 45 - 117 U/L    Protein, total 8.0 6.4 - 8.2 g/dL    Albumin 4.7 3.5 - 5.0 g/dL    Globulin 3.3 2.0 - 4.0 g/dL    A-G Ratio 1.4 1.1 - 2.2     MAGNESIUM    Collection Time: 01/20/18 12:40 AM   Result Value Ref Range    Magnesium 1.7 1.6 - 2.4 mg/dL   LIPASE    Collection Time: 01/20/18 12:40 AM   Result Value Ref Range    Lipase 152 73 - 393 U/L       IMAGING RESULTS:    No results found. MEDICATIONS GIVEN:  Medications   ondansetron (ZOFRAN) injection 4 mg (4 mg IntraVENous Given 1/20/18 0041)   prochlorperazine (COMPAZINE) injection 10 mg (10 mg IntraVENous Given 1/20/18 0055)   diphenhydrAMINE (BENADRYL) injection 50 mg (50 mg IntraVENous Given 1/20/18 0055)   sodium chloride 0.9 % bolus infusion 1,000 mL (0 mL IntraVENous IV Completed 1/20/18 0155)   famotidine (PF) (PEPCID) 20 mg in sodium chloride 0.9 % 10 mL injection (20 mg IntraVENous Given 1/20/18 0055)       IMPRESSION:  1. Abdominal pain, epigastric    2. Nausea and vomiting in adult    3. Marijuana use, continuous        PLAN:  1. Discharge Medication List as of 1/20/2018  3:37 AM      START taking these medications    Details   prochlorperazine (PROCHLORPERAZINE) 25 mg suppository Insert 1 Suppository into rectum every twelve (12) hours as needed for Nausea for up to 7 days. , Print, Disp-10 Suppository, R-0      famotidine (PEPCID) 20 mg tablet Take 1 Tab by mouth two (2) times a day., Print, Disp-20 Tab, R-0         CONTINUE these medications which have NOT CHANGED    Details   hydrOXYzine HCl (ATARAX) 50 mg tablet Take 1 Tab by mouth three (3) times daily as needed for Anxiety., Normal, Disp-90 Tab, R-1      carli's wort 300 mg tab Take  by mouth., Historical Med      multivitamin (ONE A DAY) tablet Take 1 Tab by mouth daily. , Historical Med           2.    Follow-up Information     Follow up With Details Comments Genoveva Mesa MD Schedule an appointment as soon as possible for a visit 2-4 days for recheck [id number] [facility]      Jeffrey Blum MD Schedule an appointment as soon as possible for a visit 2-4 days for recheck [address] [place] [address]  [phone]          Return to ED if worse     4:47 AM  Pt has been reexamined. Pt has no new complaints, changes or physical findings. Care plan outlined and precautions discussed. All available results were reviewed with pt. All medications were reviewed with pt. All of pt's questions and concerns were addressed. Pt agrees to F/U as instructed and agrees to return to ED upon further deterioration. Pt is ready to go home.   BERTA Shields

## 2018-01-20 NOTE — ED NOTES
Patient given popsicle for PO challenge. Patient able to keep it down without nausea or vomiting. Provider aware.

## 2018-01-20 NOTE — DISCHARGE INSTRUCTIONS
Abdominal Pain: Care Instructions  Your Care Instructions    Abdominal pain has many possible causes. Some aren't serious and get better on their own in a few days. Others need more testing and treatment. If your pain continues or gets worse, you need to be rechecked and may need more tests to find out what is wrong. You may need surgery to correct the problem. Don't ignore new symptoms, such as fever, nausea and vomiting, urination problems, pain that gets worse, and dizziness. These may be signs of a more serious problem. Your doctor may have recommended a follow-up visit in the next 8 to 12 hours. If you are not getting better, you may need more tests or treatment. The doctor has checked you carefully, but problems can develop later. If you notice any problems or new symptoms, get medical treatment right away. Follow-up care is a key part of your treatment and safety. Be sure to make and go to all appointments, and call your doctor if you are having problems. It's also a good idea to know your test results and keep a list of the medicines you take. How can you care for yourself at home? · Rest until you feel better. · To prevent dehydration, drink plenty of fluids, enough so that your urine is light yellow or clear like water. Choose water and other caffeine-free clear liquids until you feel better. If you have kidney, heart, or liver disease and have to limit fluids, talk with your doctor before you increase the amount of fluids you drink. · If your stomach is upset, eat mild foods, such as rice, dry toast or crackers, bananas, and applesauce. Try eating several small meals instead of two or three large ones. · Wait until 48 hours after all symptoms have gone away before you have spicy foods, alcohol, and drinks that contain caffeine. · Do not eat foods that are high in fat. · Avoid anti-inflammatory medicines such as aspirin, ibuprofen (Advil, Motrin), and naproxen (Aleve).  These can cause stomach upset. Talk to your doctor if you take daily aspirin for another health problem. When should you call for help? Call 911 anytime you think you may need emergency care. For example, call if:  ? · You passed out (lost consciousness). ? · You pass maroon or very bloody stools. ? · You vomit blood or what looks like coffee grounds. ? · You have new, severe belly pain. ?Call your doctor now or seek immediate medical care if:  ? · Your pain gets worse, especially if it becomes focused in one area of your belly. ? · You have a new or higher fever. ? · Your stools are black and look like tar, or they have streaks of blood. ? · You have unexpected vaginal bleeding. ? · You have symptoms of a urinary tract infection. These may include:  ¨ Pain when you urinate. ¨ Urinating more often than usual.  ¨ Blood in your urine. ? · You are dizzy or lightheaded, or you feel like you may faint. ? Watch closely for changes in your health, and be sure to contact your doctor if:  ? · You are not getting better after 1 day (24 hours). Where can you learn more? Go to http://tammie-meredith.info/. Enter L081 in the search box to learn more about \"Abdominal Pain: Care Instructions. \"  Current as of: March 20, 2017  Content Version: 11.4  © 2493-8286 [employer]. Care instructions adapted under license by [employer] (which disclaims liability or warranty for this information). If you have questions about a medical condition or this instruction, always ask your healthcare professional. [place] [address] any warranty or liability for your use of this information. Marijuana Use: Care Instructions  Your Care Instructions  During your exam, traces of marijuana were found in your body. The active chemical in marijuana is THC. THC usually can be found in urine for a few days after marijuana is used.  If use is heavy, THC may be found for weeks after use has stopped. In the [place], marijuana laws vary widely. The drug is legal in some states, mainly as a medical treatment. But marijuana possession is still a crime under federal law. Many employers have strict rules about drug use. Many do drug testing. A positive drug test might cause you to lose your job. Or it might keep you from getting hired. Follow-up care is a key part of your treatment and safety. Be sure to make and go to all appointments, and call your doctor if you are having problems. It's also a good idea to know your test results and keep a list of the medicines you take. How can you care for yourself at home? · If you use marijuana, use it safely. Do not drive or operate heavy equipment. Using marijuana may affect your judgment and coordination. It can also increase your risk of being in a car crash. · Make sure you understand the laws in your area. Using marijuana may cause legal problems. · Know your employer's policies. When should you call for help? Watch closely for changes in your health, and contact your doctor if you think you have a problem with marijuana use. Where can you learn more? Go to http://tammieQuik.iomeredith.info/. Enter M807 in the search box to learn more about \"Marijuana Use: Care Instructions. \"  Current as of: November 3, 2016  Content Version: 11.4  © 9393-5190 [employer]. Care instructions adapted under license by [employer] (which disclaims liability or warranty for this information). If you have questions about a medical condition or this instruction, always ask your healthcare professional. [place] [address] any warranty or liability for your use of this information. Nausea and Vomiting: Care Instructions  Your Care Instructions    When you are nauseated, you may feel weak and sweaty and notice a lot of saliva in your mouth. Nausea often leads to vomiting.  Most of the time you do not need to worry about nausea and vomiting, but they can be signs of other illnesses. Two common causes of nausea and vomiting are stomach flu and food poisoning. Nausea and vomiting from viral stomach flu will usually start to improve within 24 hours. Nausea and vomiting from food poisoning may last from 12 to 48 hours. The doctor has checked you carefully, but problems can develop later. If you notice any problems or new symptoms, get medical treatment right away. Follow-up care is a key part of your treatment and safety. Be sure to make and go to all appointments, and call your doctor if you are having problems. It's also a good idea to know your test results and keep a list of the medicines you take. How can you care for yourself at home? · To prevent dehydration, drink plenty of fluids, enough so that your urine is light yellow or clear like water. Choose water and other caffeine-free clear liquids until you feel better. If you have kidney, heart, or liver disease and have to limit fluids, talk with your doctor before you increase the amount of fluids you drink. · Rest in bed until you feel better. · When you are able to eat, try clear soups, mild foods, and liquids until all symptoms are gone for 12 to 48 hours. Other good choices include dry toast, crackers, cooked cereal, and gelatin dessert, such as Jell-O. When should you call for help? Call 911 anytime you think you may need emergency care. For example, call if:  ? · You passed out (lost consciousness). ?Call your doctor now or seek immediate medical care if:  ? · You have symptoms of dehydration, such as:  ¨ Dry eyes and a dry mouth. ¨ Passing only a little dark urine. ¨ Feeling thirstier than usual.   ? · You have new or worsening belly pain. ? · You have a new or higher fever. ? · You vomit blood or what looks like coffee grounds. ? Watch closely for changes in your health, and be sure to contact your doctor if:  ? · You have ongoing nausea and vomiting. ? · Your vomiting is getting worse. ? · Your vomiting lasts longer than 2 days. ? · You are not getting better as expected. Where can you learn more? Go to http://tammie-meredith.info/. Enter 25 [id number] in the search box to learn more about \"Nausea and Vomiting: Care Instructions. \"  Current as of: March 20, 2017  Content Version: 11.4  © 8254-5189 [employer] Care instructions adapted under license by [employer] (which disclaims liability or warranty for this information). If you have questions about a medical condition or this instruction, always ask your healthcare professional. [place] [address] any warranty or liability for your use of this information. We hope that we have addressed all of your medical concerns. The examination and treatment you received in the Emergency Department were for an emergent problem and were not intended as complete care. It is important that you follow up with your healthcare provider(s) for ongoing care. If your symptoms worsen or do not improve as expected, and you are unable to reach your usual health care provider(s), you should return to the Emergency Department. Today's healthcare is undergoing tremendous change, and patient satisfaction surveys are one of the many tools to assess the quality of medical care. You may receive a survey from the [employer] regarding your experience in the Emergency Department. I hope that your experience has been completely positive, particularly the medical care that I provided. As such, please participate in the survey; anything less than excellent does not meet my expectations or intentions. [address] [facility] and [address] [place] [address] participate in nationally recognized quality of care measures.   If your blood pressure is greater than 120/80, as reported below, we urge that you seek medical care to address the potential of high blood pressure, commonly known as hypertension. Hypertension can be hereditary or can be caused by certain medical conditions, pain, stress, or \"white coat syndrome. \"       Please make an appointment with your health care provider(s) for follow up of your Emergency Department visit. VITALS:   Patient Vitals for the past 8 hrs:   Temp Pulse Resp BP SpO2   01/19/18 2324 97.4 °F (36.3 °C) 77 18 146/75 100 %          Thank you for allowing us to provide you with medical care today. We realize that you have many choices for your emergency care needs. Please choose us in the future for any continued health care needs. Jean Quintero  Quick, [facility].   Office: [phone]            Recent Results (from the past 24 hour(s))   URINALYSIS W/ REFLEX CULTURE    Collection Time: 01/20/18 12:23 AM   Result Value Ref Range    Color YELLOW/STRAW      Appearance CLOUDY (A) CLEAR      pH (UA) 8.5 (H) 5.0 - 8.0      Protein TRACE (A) NEG mg/dL    Glucose NEGATIVE  NEG mg/dL    Ketone 15 (A) NEG mg/dL    Bilirubin NEGATIVE  NEG      Blood NEGATIVE  NEG      Urobilinogen 0.2 0.2 - 1.0 EU/dL    Nitrites NEGATIVE  NEG      Leukocyte Esterase TRACE (A) NEG      WBC 5-10 0 - 4 /hpf    RBC 0-5 0 - 5 /hpf    Epithelial cells MODERATE (A) FEW /lpf    Bacteria 2+ (A) NEG /hpf    UA:UC IF INDICATED URINE CULTURE ORDERED (A) CNI     HCG URINE, QL. - POC    Collection Time: 01/20/18 12:23 AM   Result Value Ref Range    Pregnancy test,urine (POC) NEGATIVE  NEG     CBC WITH AUTOMATED DIFF    Collection Time: 01/20/18 12:40 AM   Result Value Ref Range    WBC 8.9 3.6 - 11.0 K/uL    RBC 4.44 3.80 - 5.20 M/uL    HGB 13.5 11.5 - 16.0 g/dL    HCT 37.3 35.0 - 47.0 %    MCV 84.0 80.0 - 99.0 FL    MCH 30.4 26.0 - 34.0 PG    MCHC 36.2 30.0 - 36.5 g/dL    RDW 12.0 11.5 - 14.5 %    PLATELET 097 158 - 582 K/uL    NEUTROPHILS 82 (H) 32 - 75 %    LYMPHOCYTES 14 12 - 49 %    MONOCYTES 4 (L) 5 - 13 %    EOSINOPHILS 0 0 - 7 %    BASOPHILS 0 0 - 1 %    ABS. NEUTROPHILS 7.3 1.8 - 8.0 K/UL    ABS. LYMPHOCYTES 1.2 0.8 - 3.5 K/UL    ABS. MONOCYTES 0.3 0.0 - 1.0 K/UL    ABS. EOSINOPHILS 0.0 0.0 - 0.4 K/UL    ABS. BASOPHILS 0.0 0.0 - 0.1 K/UL   METABOLIC PANEL, COMPREHENSIVE    Collection Time: 01/20/18 12:40 AM   Result Value Ref Range    Sodium 139 136 - 145 mmol/L    Potassium 3.3 (L) 3.5 - 5.1 mmol/L    Chloride 99 97 - 108 mmol/L    CO2 28 21 - 32 mmol/L    Anion gap 12 5 - 15 mmol/L    Glucose 132 (H) 65 - 100 mg/dL    BUN 13 6 - 20 MG/DL    Creatinine 0.78 0.55 - 1.02 MG/DL    BUN/Creatinine ratio 17 12 - 20      GFR est AA >60 >60 ml/min/1.73m2    GFR est non-AA >60 >60 ml/min/1.73m2    Calcium 9.7 8.5 - 10.1 MG/DL    Bilirubin, total 0.3 0.2 - 1.0 MG/DL    ALT (SGPT) 20 12 - 78 U/L    AST (SGOT) 21 15 - 37 U/L    Alk. phosphatase 72 45 - 117 U/L    Protein, total 8.0 6.4 - 8.2 g/dL    Albumin 4.7 3.5 - 5.0 g/dL    Globulin 3.3 2.0 - 4.0 g/dL    A-G Ratio 1.4 1.1 - 2.2     MAGNESIUM    Collection Time: 01/20/18 12:40 AM   Result Value Ref Range    Magnesium 1.7 1.6 - 2.4 mg/dL   LIPASE    Collection Time: 01/20/18 12:40 AM   Result Value Ref Range    Lipase 152 73 - 393 U/L       No results found.

## 2018-01-21 LAB
BACTERIA SPEC CULT: NORMAL
CC UR VC: NORMAL
SERVICE CMNT-IMP: NORMAL

## 2020-08-18 ENCOUNTER — VIRTUAL VISIT (OUTPATIENT)
Dept: FAMILY MEDICINE CLINIC | Age: 35
End: 2020-08-18
Payer: MEDICAID

## 2020-08-18 DIAGNOSIS — M79.672 LEFT FOOT PAIN: Primary | ICD-10-CM

## 2020-08-18 DIAGNOSIS — M25.572 ACUTE LEFT ANKLE PAIN: ICD-10-CM

## 2020-08-18 PROCEDURE — 99442 PR PHYS/QHP TELEPHONE EVALUATION 11-20 MIN: CPT | Performed by: INTERNAL MEDICINE

## 2020-08-18 NOTE — PROGRESS NOTES
CC:  Chief Complaint   Patient presents with   [place] Establish Care     Previous patient at [facility].. Has not been seen for 3-years. Here to re-establish care.  Foot Pain     Twisted L-ankle about 5-weeks ago. Pain has been excruciating. has not had it evaluated. Using crutches that she has at home.  Anxiety     H/o of biplar Disorder, very anxious recently. H/O depression. No SI         Alex Hughes is a 29 y.o. female who was seen by synchronous (real-time) TELEPHONE CALL on 8/18/2020 for No chief complaint on file. Assessment & Plan:   Diagnoses and all orders for this visit:    1. Left foot pain  -     XR FOOT LT MIN 3 V; Future  -     XR ANKLE LT MIN 3 V; Future    2. Acute left ankle pain  -     XR FOOT LT MIN 3 V; Future  -     XR ANKLE LT MIN 3 V; Future    We also discussed her anxiety and depression. She is planning to make an appointment for labs and well woman. She currently does not have insurance, so things are tight, but understands that she probably need the foot xray. Lastly, she denies SI/SI. If her mental health issues worsens, she understands to contact us. Additionally, she will take Tylenol arthritis for her pain. It has been helping. Will advise when her xray results return. Pt verbalizes understanding of plan of care and denies further questions or concerns at this time. I spent at least 18 minutes on this visit with this new patient. Subjective:   34F who presents as a new patient to [facility]., but actually had been seen here in the past. Her last visit with me was in 2016 and she saw another provider in our office in 2017. Today, she is here with a 5-week h/o twisting her ankle and having increased pain and inability to bear weight. The pain was so searing that she almost passed out from it. She has been taking Tylenol and Motrin, but they are not really helping.      IN addition, in the past she had an opioid dependence problem and through AA and \"prayer\" she was able to mostly over come the issue. She also has a h/o Anxiety and bipolar disorder. She has not been under the care of a therapist for sometime. She is tearful throughout the evaluation, but denies SI. She has been depressed with COVID-19 and all the social changes it affords. She is planning to schedule another visit for well woman. Today, her visit is to evaluate the L-foot and ankle pain. Patient Active Problem List   Diagnosis Code    Cholesteatoma middle ear     Pain in joint, multiple sites M25.50    History of eating disorder Z86.59    Bipolar affective disorder, currently depressed, mild ([facility]) F31.31       Current Outpatient Medications   Medication Sig Dispense Refill    carli's wort 300 mg tab Take  by mouth.  multivitamin (ONE A DAY) tablet Take 1 Tab by mouth daily. Allergies   Allergen Reactions    Keflex [Cephalexin] Other (comments)     Mini seizure. Past Medical History:   Diagnosis Date    Anxiety     Bipolar affect, depressed     Eating disorder, anorexia nervosa 2006    Has been hospitalized for this in the past    Fibromyalgia     IBS (irritable bowel syndrome)     Kidney stones     Opiate addiction ([facility] [address] 2015    Per [facility] - Went to pain clinic    PTSD (post-traumatic stress disorder) 2015    Dx at [facility] 2015       Past Surgical History:   Procedure Laterality Date    HX LEEP PROCEDURE  2015    precancerous cells    HX TONSIL AND ADENOIDECTOMY      HX WISDOM TEETH EXTRACTION         Family History   Problem Relation Age of Onset    Psychiatric Disorder Mother     Elevated Lipids Mother     Psychiatric Disorder Sister     SLE Paternal Grandmother          Tobacco Use    Smoking status: Former Smoker     Last attempt to quit: 2/15/2006     Years since quittin.5    Smokeless tobacco: Never Used   Substance Use Topics    Alcohol use: No     Alcohol/week: 0.0 standard drinks       Review of Systems   Constitutional: Negative. HENT: Negative. Eyes: Negative. Respiratory: Negative. Cardiovascular: Negative. Gastrointestinal: Negative. Genitourinary: Negative. Musculoskeletal: Negative. Skin: Negative. Neurological: Negative. Endo/Heme/Allergies: Negative. Psychiatric/Behavioral: Positive for depression. Negative for suicidal ideas. The patient is nervous/anxious. All other systems reviewed and are negative. Objective:   No flowsheet data found. General: alert, cooperative, no distress   Mental  status: normal mood, behavior, speech and thought processes, able to follow commands   Psychiatric: normal affect, consistent with stated mood, no evidence of hallucinations       We discussed the expected course, resolution and complications of the diagnosis(es) in detail. Medication risks, benefits, costs, interactions, and alternatives were discussed as indicated. I advised her to contact the office if her condition worsens, changes or fails to improve as anticipated. She expressed understanding with the diagnosis(es) and plan. Jhonny Meneses, who was evaluated through a patient-initiated, synchronous (real-time) TELEPHONE CALL, and/or her healthcare decision maker, is aware that it is a billable service, with coverage as determined by her insurance carrier. She provided verbal consent to proceed: Yes, and patient identification was verified. It was conducted pursuant to the emergency declaration under the [address] [place] [address] authority and the [employer] Act. A caregiver was present when appropriate. Ability to conduct physical exam was limited. I was in the office. The patient was at home.       Remi Jalloh MD

## 2020-09-25 ENCOUNTER — VIRTUAL VISIT (OUTPATIENT)
Dept: FAMILY MEDICINE CLINIC | Age: 35
End: 2020-09-25
Payer: MEDICAID

## 2020-09-25 DIAGNOSIS — L65.9 HAIR LOSS: ICD-10-CM

## 2020-09-25 DIAGNOSIS — Z11.3 SCREENING EXAMINATION FOR STD (SEXUALLY TRANSMITTED DISEASE): ICD-10-CM

## 2020-09-25 DIAGNOSIS — B35.3 TINEA PEDIS OF BOTH FEET: Primary | ICD-10-CM

## 2020-09-25 PROCEDURE — 99213 OFFICE O/P EST LOW 20 MIN: CPT | Performed by: NURSE PRACTITIONER

## 2020-09-25 NOTE — PROGRESS NOTES
HISTORY OF PRESENT ILLNESS  Jin Khan is a 29 y.o. female. HPI   Pt presents with \"skin infection and labs\"  Visit was conducted via [employer]. me  Pt was located at home, provider was located at [facility]  Visit lasted 6 minutes  Jin Khan, who was evaluated through a synchronous (real-time) audio-video encounter, and/or her healthcare decision maker, is aware that it is a billable service, with coverage as determined by her insurance carrier. She provided verbal consent to proceed: Yes, and patient identification was verified. It was conducted pursuant to the emergency declaration under the [facility], [address] [place] [address] authority and the [employer] Act. A caregiver was present when appropriate. Ability to conduct physical exam was limited. I was in the office. The patient was at home. Pt states that she believes that she has athletes foot in her feet  There is a red, rash  The skin is cracking and can be painful at times  She has been using Lotrimin, and this seems to be working  She wanted to ensure that this was the correct thing to do? In addition, she is interested in getting some labs drawn. She would like blood work STD screening  In addition, she has noted hair loss and some fatigue  Review of Systems   Constitutional: Negative for fever. Physical Exam  Constitutional:       Appearance: Normal appearance. Pulmonary:      Effort: Pulmonary effort is normal.   Neurological:      Mental Status: She is alert. Psychiatric:         Mood and Affect: Mood normal.         Behavior: Behavior normal.         ASSESSMENT and PLAN    ICD-10-CM ICD-9-CM    1. Tinea pedis of both feet  B35.3 110.4    2. Screening examination for STD (sexually transmitted disease)  Z11.3 V74.5 HIV 1/2 AG/AB, 4TH GENERATION,W RFLX CONFIRM      HEPATITIS C AB      RPR   3.  Hair loss  L65.9 704.00 CBC W/O DIFF      METABOLIC PANEL, COMPREHENSIVE      THYROID CASCADE PROFILE     Will notify when labs return    Educated about applying ointment as prescribed  Should notify office should symptoms continue and/or worsen    Should always come to office for vaginal swab for chlamydia, jay, etc, screening. Should she have any vaginal symptoms    Pt informed to return to office with worsening of symptoms, or PRN with any questions or concerns. Pt verbalizes understanding of plan of care and denies further questions or concerns at this time.

## 2020-09-30 ENCOUNTER — TELEPHONE (OUTPATIENT)
Dept: FAMILY MEDICINE CLINIC | Age: 35
End: 2020-09-30

## 2020-09-30 ENCOUNTER — HOSPITAL ENCOUNTER (OUTPATIENT)
Dept: GENERAL RADIOLOGY | Age: 35
Discharge: HOME | End: 2020-09-30
Attending: INTERNAL MEDICINE
Payer: MEDICAID

## 2020-09-30 DIAGNOSIS — M25.572 ACUTE LEFT ANKLE PAIN: ICD-10-CM

## 2020-09-30 DIAGNOSIS — M79.672 LEFT FOOT PAIN: ICD-10-CM

## 2020-09-30 PROCEDURE — 73630 X-RAY EXAM OF FOOT: CPT

## 2020-09-30 PROCEDURE — 73610 X-RAY EXAM OF ANKLE: CPT

## 2020-09-30 NOTE — TELEPHONE ENCOUNTER
----- Message from Sonal Wan MD sent at 9/30/2020  3:59 PM EDT -----  I sent patient a mychart message, but she also needs to schedule a virtual appointment to discuss the findings at her earliest convenience.    Thanks,   Dr. Mushtaq Galeas

## 2020-09-30 NOTE — TELEPHONE ENCOUNTER
L/M with result note. Also asked pt to call and make a virtual appointment with Dr Remi Barry to go over results.

## 2020-10-01 ENCOUNTER — TELEPHONE (OUTPATIENT)
Dept: FAMILY MEDICINE CLINIC | Age: 35
End: 2020-10-01

## 2020-10-01 ENCOUNTER — VIRTUAL VISIT (OUTPATIENT)
Dept: FAMILY MEDICINE CLINIC | Age: 35
End: 2020-10-01
Payer: MEDICAID

## 2020-10-01 DIAGNOSIS — H92.01 RIGHT EAR PAIN: ICD-10-CM

## 2020-10-01 DIAGNOSIS — G90.522 COMPLEX REGIONAL PAIN SYNDROME TYPE 1 OF LEFT LOWER EXTREMITY: Primary | ICD-10-CM

## 2020-10-01 DIAGNOSIS — H71.92 CHOLESTEATOMA OF LEFT EAR: ICD-10-CM

## 2020-10-01 LAB
ALBUMIN SERPL-MCNC: 4.8 G/DL (ref 3.8–4.8)
ALBUMIN/GLOB SERPL: 1.8 {RATIO} (ref 1.2–2.2)
ALP SERPL-CCNC: 75 IU/L (ref 39–117)
ALT SERPL-CCNC: 18 IU/L (ref 0–32)
AST SERPL-CCNC: 22 IU/L (ref 0–40)
BILIRUB SERPL-MCNC: 0.3 MG/DL (ref 0–1.2)
BUN SERPL-MCNC: 6 MG/DL (ref 6–20)
BUN/CREAT SERPL: 10 (ref 9–23)
CALCIUM SERPL-MCNC: 9.6 MG/DL (ref 8.7–10.2)
CHLORIDE SERPL-SCNC: 101 MMOL/L (ref 96–106)
CO2 SERPL-SCNC: 25 MMOL/L (ref 20–29)
CREAT SERPL-MCNC: 0.63 MG/DL (ref 0.57–1)
ERYTHROCYTE [DISTWIDTH] IN BLOOD BY AUTOMATED COUNT: 12.4 % (ref 11.7–15.4)
GLOBULIN SER CALC-MCNC: 2.7 G/DL (ref 1.5–4.5)
GLUCOSE SERPL-MCNC: 75 MG/DL (ref 65–99)
HCT VFR BLD AUTO: 35.5 % (ref 34–46.6)
HCV AB S/CO SERPL IA: <0.1 S/CO RATIO (ref 0–0.9)
HGB BLD-MCNC: 12.6 G/DL (ref 11.1–15.9)
HIV 1+2 AB+HIV1 P24 AG SERPL QL IA: NON REACTIVE
MCH RBC QN AUTO: 30.3 PG (ref 26.6–33)
MCHC RBC AUTO-ENTMCNC: 35.5 G/DL (ref 31.5–35.7)
MCV RBC AUTO: 85 FL (ref 79–97)
PLATELET # BLD AUTO: 196 X10E3/UL (ref 150–450)
POTASSIUM SERPL-SCNC: 3.8 MMOL/L (ref 3.5–5.2)
PROT SERPL-MCNC: 7.5 G/DL (ref 6–8.5)
RBC # BLD AUTO: 4.16 X10E6/UL (ref 3.77–5.28)
RPR SER QL: NON REACTIVE
SODIUM SERPL-SCNC: 141 MMOL/L (ref 134–144)
TSH SERPL DL<=0.005 MIU/L-ACNC: 1.35 UIU/ML (ref 0.45–4.5)
WBC # BLD AUTO: 5 X10E3/UL (ref 3.4–10.8)

## 2020-10-01 PROCEDURE — 99213 OFFICE O/P EST LOW 20 MIN: CPT | Performed by: INTERNAL MEDICINE

## 2020-10-01 RX ORDER — GABAPENTIN 100 MG/1
100 CAPSULE ORAL
Qty: 30 CAP | Refills: 0 | Status: SHIPPED | OUTPATIENT
Start: 2020-10-01 | End: 2020-10-19 | Stop reason: SDDI
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
## 2020-10-01 NOTE — TELEPHONE ENCOUNTER
----- Message from Samantha Duong sent at 9/30/2020  5:58 PM EDT -----  Regarding: Dr. Sujata Barahona first and last name:pt  Reason for call:questions about lab results  Callback required yes/no and why:yes to clarify if pt needs to libby a f/u appt  Best contact number(s):[phone]  Details to clarify the request:Pt had questions about lab results, not sure if she needed to make a f/u apt. My scheduling systems were down when I received the call, advised pt I would still put message in.

## 2020-10-19 ENCOUNTER — VIRTUAL VISIT (OUTPATIENT)
Dept: FAMILY MEDICINE CLINIC | Age: 35
End: 2020-10-19
Payer: MEDICAID

## 2020-10-19 DIAGNOSIS — M54.2 NECK PAIN: Primary | ICD-10-CM

## 2020-10-19 DIAGNOSIS — R51.9 CHRONIC FACIAL PAIN: ICD-10-CM

## 2020-10-19 DIAGNOSIS — G89.29 CHRONIC FACIAL PAIN: ICD-10-CM

## 2020-10-19 PROCEDURE — 99213 OFFICE O/P EST LOW 20 MIN: CPT | Performed by: FAMILY MEDICINE

## 2020-10-19 NOTE — PROGRESS NOTES
Valentino Lal is a 29 y.o. female who was seen by synchronous (real-time) audio-video technology on 10/19/2020 for Neck Pain and Facial Pain        Assessment & Plan:   Diagnoses and all orders for this visit:    1. Neck pain    2. Chronic facial pain      Order XR C spine. If normal, refer to Neurology. I spent at least 7 minutes on this visit with this established patient. 712  Subjective:       Prior to Admission medications    Medication Sig Start Date End Date Taking? Authorizing Provider   carli's wort 300 mg tab Take  by mouth. Yes Provider, Historical   multivitamin (ONE A DAY) tablet Take 1 Tab by mouth daily. Yes Provider, Historical     Patient Active Problem List    Diagnosis Date Noted    History of eating disorder 04/15/2016    Bipolar affective disorder, currently depressed, mild ([facility] [address] 04/15/2016    Pain in joint, multiple sites 04/11/2014    Cholesteatoma middle ear 09/20/2010     Current Outpatient Medications   Medication Sig Dispense Refill    carli's wort 300 mg tab Take  by mouth.  multivitamin (ONE A DAY) tablet Take 1 Tab by mouth daily. Allergies   Allergen Reactions    Keflex [Cephalexin] Other (comments)     Mini seizure. Past Medical History:   Diagnosis Date    Anxiety     Bipolar affect, depressed     Eating disorder, anorexia nervosa 2006    Has been hospitalized for this in the past    Fibromyalgia     IBS (irritable bowel syndrome)     Kidney stones     Opiate addiction ([facility] [address] 2015    Per [facility] - Went to pain clinic    PTSD (post-traumatic stress disorder) 2015    Dx at [facility] 2015     Past Surgical History:   Procedure Laterality Date    HX LEEP PROCEDURE  2015    precancerous cells    HX TONSIL AND ADENOIDECTOMY      HX WISDOM TEETH EXTRACTION         ROS  C/O chronic neck pain and R facial pain x 5 years. Getting worse. ENT evaluation today and was told not from cholesteatoma. Pt describes pain as dull pressure but gets sharp and tingling. Constant nature. She does not take analgesics form OTC. Sometimes has HA. No visual problems. Pt did not take gabapentin prescribed by Dr Greer Apodaca at last visit for RSD ankle. Objective:     Patient-Reported Vitals 10/19/2020   Patient-Reported Weight 120   Patient-Reported Height 5'6      General: alert, cooperative, no distress   Mental  status: normal mood, behavior, speech, dress, motor activity, and thought processes, able to follow commands   HENT: NCAT   Neck: no visualized mass   Resp: no respiratory distress   Neuro: no gross deficits   Skin: no discoloration or lesions of concern on visible areas   Psychiatric: normal affect, consistent with stated mood, no evidence of hallucinations     Additional exam findings: We discussed the expected course, resolution and complications of the diagnosis(es) in detail. Medication risks, benefits, costs, interactions, and alternatives were discussed as indicated. I advised her to contact the office if her condition worsens, changes or fails to improve as anticipated. She expressed understanding with the diagnosis(es) and plan. Braden Corcoran, who was evaluated through a patient-initiated, synchronous (real-time) audio-video encounter, and/or her healthcare decision maker, is aware that it is a billable service, with coverage as determined by her insurance carrier. She provided verbal consent to proceed: Yes, and patient identification was verified. It was conducted pursuant to the emergency declaration under the [facility], [address] [place] [address] authority and the [employer] General Act. A caregiver was present when appropriate. Ability to conduct physical exam was limited. I was in the office. The patient was at home.       Roslyn Sewell MD

## 2020-10-21 ENCOUNTER — HOSPITAL ENCOUNTER (OUTPATIENT)
Dept: GENERAL RADIOLOGY | Age: 35
Discharge: HOME | End: 2020-10-21
Attending: FAMILY MEDICINE
Payer: MEDICAID

## 2020-10-21 DIAGNOSIS — M54.2 NECK PAIN: ICD-10-CM

## 2020-10-21 PROCEDURE — 72050 X-RAY EXAM NECK SPINE 4/5VWS: CPT

## 2020-10-22 ENCOUNTER — TELEPHONE (OUTPATIENT)
Dept: FAMILY MEDICINE CLINIC | Age: 35
End: 2020-10-22

## 2020-10-22 DIAGNOSIS — G50.0 FACIAL PAIN SYNDROME: Primary | ICD-10-CM

## 2020-10-22 NOTE — TELEPHONE ENCOUNTER
LVM - Your neck XR was normal.  Refer to Neurology for chronic pain syndrome. Office number to set up own appt with Dr Pritesh Davis.  Phone: [phone]

## 2022-07-11 ENCOUNTER — TELEPHONE (OUTPATIENT)
Dept: FAMILY MEDICINE CLINIC | Age: 37
End: 2022-07-11

## 2022-07-11 ENCOUNTER — OFFICE VISIT (OUTPATIENT)
Dept: FAMILY MEDICINE CLINIC | Age: 37
End: 2022-07-11
Payer: MEDICAID

## 2022-07-11 ENCOUNTER — NURSE TRIAGE (OUTPATIENT)
Dept: OTHER | Facility: CLINIC | Age: 37
End: 2022-07-11

## 2022-07-11 VITALS
OXYGEN SATURATION: 98 % | DIASTOLIC BLOOD PRESSURE: 68 MMHG | SYSTOLIC BLOOD PRESSURE: 102 MMHG | HEART RATE: 96 BPM | BODY MASS INDEX: 18.48 KG/M2 | WEIGHT: 115 LBS | TEMPERATURE: 98.6 F | RESPIRATION RATE: 20 BRPM | HEIGHT: 66 IN

## 2022-07-11 DIAGNOSIS — M54.2 NECK PAIN: ICD-10-CM

## 2022-07-11 DIAGNOSIS — H53.2 DIPLOPIA: ICD-10-CM

## 2022-07-11 DIAGNOSIS — Q67.0 FACIAL ASYMMETRY: Primary | ICD-10-CM

## 2022-07-11 DIAGNOSIS — R42 VERTIGO: ICD-10-CM

## 2022-07-11 DIAGNOSIS — R51.9 NONINTRACTABLE EPISODIC HEADACHE, UNSPECIFIED HEADACHE TYPE: ICD-10-CM

## 2022-07-11 PROCEDURE — 99214 OFFICE O/P EST MOD 30 MIN: CPT | Performed by: FAMILY MEDICINE

## 2022-07-11 RX ORDER — IBUPROFEN 200 MG
200 TABLET ORAL
COMMUNITY
End: 2022-07-11

## 2022-07-11 RX ORDER — IBUPROFEN 400 MG/1
400 TABLET ORAL
COMMUNITY

## 2022-07-11 NOTE — TELEPHONE ENCOUNTER
Received call from [address] at [facility] with Red Flag Complaint. Subjective: Caller states \"For years I have had bouts of vertigo\"     Current Symptoms: dizziness, nausea, needs to \"re focus vision\", intermittent neck and head pain but can touch chin to chest, bilateral hands numbness and tingling    Onset: \"a couple of months\"        Pain Severity: Declined to give pain scale number     Denies - blurred vision / double vision / loss of vision / heart palpitations / chest pain     Temperature: denies     What has been tried: drinking water      Recommended disposition: See in Office Today     Patient advised not to drive    Care advice provided, patient verbalizes understanding; denies any other questions or concerns; instructed to call back for any new or worsening symptoms. Patient/Caller agrees with recommended disposition; writer provided warm transfer to Kathrine Turcios at [facility] for appointment scheduling    Attention Provider: Thank you for allowing me to participate in the care of your patient. The patient was connected to triage in response to information provided to the [facility]. Please do not respond through this encounter as the response is not directed to a shared pool.         Reason for Disposition   Lightheadedness (dizziness) present now, after 2 hours of rest and fluids    Protocols used: DIZZINESS-ADULT-OH

## 2022-07-11 NOTE — TELEPHONE ENCOUNTER
HPI Notes    Name: Citlali Taylor  : 1966         Chief Complaint:     Chief Complaint   Patient presents with    Migraine     Patient c/o having migraine frequently and having some forgetfulness. She is slurring her words her family noticed.  Memory Loss     Patient states its been going on for about 3 months.  Stress       History of Present Illness:      HPI     This is a very nice 80-year-old woman with medical history significant for migraine headaches, past history of CVAs presenting for evaluation of worsening frequency of headaches, as well as dysarthria and memory loss. Headaches: these have recently changed in character to affecting the crown of the head with discomfort, visual disturbances, \"light-headed\" feeling, nausea, especially over the last three months. Has been using Relpax once per week for the past four weeks. Using topiramate for migraine prevention. Neurological deficits: family notes that during conversations, she will often slur her words, \"stumble through a conversation\", \"lose my train of thought\", and feels more fatigued. This has been ongoing for about three months, but has been worse over the past 30 days.      Past Medical History:     Past Medical History:   Diagnosis Date    Anemia as a teen    Cerebral artery occlusion with cerebral infarction ([facility] [address]     \"mini stroke\"    Crohn's     Headache, classical migraine     migraines    Hyperlipidemia     Nausea & vomiting     Problems with head, neck, and trunk       Reviewed all health maintenance requirements and ordered appropriate tests  Health Maintenance Due   Topic Date Due    Hepatitis C screen  Never done    HIV screen  Never done    Shingles Vaccine (1 of 2) Never done    DTaP/Tdap/Td vaccine (2 - Td or Tdap) 2018       Past Surgical History:     Past Surgical History:   Procedure Laterality Date    BACK SURGERY      cervical spine injections    BLADDER SURGERY  1972    CARPAL TUNNEL RELEASE Please call Neurology clinic to try to expedite new pt appt. Fax OV note. I have ordered CT head but waiting for that to be done. Right     right dequervains also    CERVICAL SPINE SURGERY  2004    HYSTERECTOMY, TOTAL ABDOMINAL  2001    OVARY REMOVAL  2008    SHOULDER SURGERY  2003        Medications:       Prior to Admission medications    Medication Sig Start Date End Date Taking? Authorizing Provider   tiZANidine (ZANAFLEX) 4 MG tablet TAKE TWO TABLETS BY MOUTH EVERY EVENING 7/9/21  Yes Rissa Hodge,    topiramate (TOPAMAX) 50 MG tablet TAKE ONE TABLET BY MOUTH TWO TIMES A DAY 7/9/21  Yes Damion Vela DO   ondansetron (ZOFRAN ODT) 4 MG disintegrating tablet Take 1 tablet by mouth every 8 hours as needed for Nausea or Vomiting 7/9/21  Yes Damion Vela DO   Ubrogepant 50 MG TABS Take 1 tablet by mouth once as needed (migraine) 7/9/21 7/9/21 Yes Arjun Vela DO   escitalopram (LEXAPRO) 20 MG tablet TAKE 1 TABLET BY MOUTH ONE TIME A DAY 9/25/20  Yes Elias Washington MD   eletriptan (RELPAX) 40 MG tablet Take 40 mg by mouth once as needed may repeat in 2 hours if necessary   Yes Historical Provider, MD   ibuprofen (ADVIL;MOTRIN) 800 MG tablet Take 1 tablet by mouth every 8 hours as needed for Pain 5/11/17  Yes Boo Loyd DO   aspirin EC 81 MG EC tablet Take 1 tablet by mouth daily  Patient not taking: Reported on 7/9/2021 8/1/16   Larisa Baron DO        Allergies:       Sulfa antibiotics    Social History:     Tobacco:    reports that she has never smoked. She has never used smokeless tobacco.  Alcohol:      reports no history of alcohol use. Drug Use:  reports no history of drug use. Family History:     No family history on file. Review of Systems:         Review of Systems   Constitutional: Negative for fever. HENT: Negative for rhinorrhea, sinus pain and sneezing. Respiratory: Negative for cough and wheezing. Cardiovascular: Negative for chest pain. Gastrointestinal: Negative for abdominal pain, diarrhea, nausea and vomiting. Musculoskeletal: Negative for back pain.    Skin: Negative for rash. Neurological: Positive for speech difficulty, light-headedness and headaches. Psychiatric/Behavioral: The patient is nervous/anxious. Physical Exam:     Vitals:  /84   Pulse 64   Temp 97.2 °F (36.2 °C) (Temporal)   Ht 4' 11\" (1.499 m)   Wt 159 lb (72.1 kg)   SpO2 97%   BMI 32.11 kg/m²       Physical Exam  Vitals and nursing note reviewed. Constitutional:       General: She is not in acute distress. Appearance: Normal appearance. Skin:     General: Skin is warm and dry. Capillary Refill: Capillary refill takes less than 2 seconds. Neurological:      General: No focal deficit present. Mental Status: She is alert and oriented to person, place, and time. Mental status is at baseline. GCS: GCS eye subscore is 4. GCS verbal subscore is 5. GCS motor subscore is 6. Cranial Nerves: No cranial nerve deficit. Motor: No weakness. Coordination: Coordination normal. Finger-Nose-Finger Test normal. Rapid alternating movements normal.      Gait: Gait normal.      Deep Tendon Reflexes:      Reflex Scores:       Bicep reflexes are 2+ on the right side and 2+ on the left side. Brachioradialis reflexes are 2+ on the right side and 2+ on the left side. Comments: NIH stroke score: 0   Psychiatric:         Mood and Affect: Mood normal.         Behavior: Behavior normal.         Thought Content:  Thought content normal.                 Data:     Lab Results   Component Value Date     04/16/2019    K 3.9 04/16/2019     04/16/2019    CO2 18 04/16/2019    BUN 18 04/16/2019    CREATININE 0.70 04/16/2019    GLUCOSE 105 03/05/2020    PROT 8.3 04/16/2019    LABALBU 4.9 04/16/2019    BILITOT 0.68 04/16/2019    ALKPHOS 100 04/16/2019    AST 22 04/16/2019    ALT 28 04/16/2019     Lab Results   Component Value Date    WBC 8.8 04/16/2019    RBC 5.06 04/16/2019    HGB 15.1 04/16/2019    HCT 45.6 04/16/2019    MCV 90.2 04/16/2019    MCH 29.8 04/16/2019    [facility] 33.0 04/16/2019    RDW 13.9 04/16/2019     04/16/2019    MPV NOT REPORTED 04/16/2019     Lab Results   Component Value Date    TSH 4.16 07/22/2015     Lab Results   Component Value Date    CHOL 229 03/05/2020    HDL 66 03/05/2020          Assessment & Plan        Diagnosis Orders   1. Migraine with aura and without status migrainosus, not intractable  tiZANidine (ZANAFLEX) 4 MG tablet    topiramate (TOPAMAX) 50 MG tablet    ondansetron (ZOFRAN ODT) 4 MG disintegrating tablet    Ubrogepant 50 MG TABS       1. Not concern for active CVA, NIH stroke score 0, would not recommend advanced imaging of the brain. As the symptoms seem to be more in the context of her headaches, she may be suffering from complex migraines. In any case, she feels that her current migraine regimen is not adequately controlling her symptoms. She is having currently for breakthrough migraines per month. I would recommend changing her abortive therapy before prophylactic therapy. Decision was made to stop Relpax and start Ubrelvy. The patient I had a long discussion about starting Ubrelvy. We discussed its mechanism of action, intended goals, adverse effects, as well as common side effects. They were able to verbalize understanding, and repeat plan back to me. The patient requested a visit in 4 days to discuss her anxiety, which is reasonable. Completed Refills   Requested Prescriptions     Signed Prescriptions Disp Refills    tiZANidine (ZANAFLEX) 4 MG tablet 180 tablet 3     Sig: TAKE TWO TABLETS BY MOUTH EVERY EVENING    topiramate (TOPAMAX) 50 MG tablet 180 tablet 3     Sig: TAKE ONE TABLET BY MOUTH TWO TIMES A DAY    ondansetron (ZOFRAN ODT) 4 MG disintegrating tablet 30 tablet 1     Sig: Take 1 tablet by mouth every 8 hours as needed for Nausea or Vomiting    Ubrogepant 50 MG TABS 30 tablet 1     Sig: Take 1 tablet by mouth once as needed (migraine)     No follow-ups on file.   Orders medications, diet and exercise. Discussed use, benefit, and side effects of prescribed medications. Barriers to medication compliance addressed. Patient given educational materials - see patient instructions. All patient questions answered. Patient voiced understanding.

## 2022-07-11 NOTE — PATIENT INSTRUCTIONS
Vertigo: Care Instructions  Your Care Instructions     Vertigo is the feeling that you or your surroundings are moving when there is no actual movement. It is often described as a feeling of spinning, whirling, falling, or tilting. Vertigo may make you vomit or feel nauseated. You may have trouble standing or walking and may lose your balance. Vertigo is often related to an inner ear problem, but it can have other more serious causes. If vertigo continues, you may need more tests to find its cause. Follow-up care is a key part of your treatment and safety. Be sure to make and go to all appointments, and call your doctor if you are having problems. It's also a good idea to know your test results and keep a list of the medicines you take. How can you care for yourself at home? · Do not lie flat on your back. Prop yourself up slightly. This may reduce the spinning feeling. Keep your eyes open. · Move slowly so that you do not fall. · If your doctor recommends medicine, take it exactly as directed. · Do not drive while you are having vertigo. Certain exercises, called Dove-Daroff exercises, can help decrease vertigo. To do Dove-Daroff exercises:  · Sit on the edge of a bed or sofa and quickly lie down on the side that causes the worst vertigo. Lie on your side with your ear down. · Stay in this position for at least 30 seconds or until the vertigo goes away. · Sit up. If this causes vertigo, wait for it to stop. · Repeat the procedure on the other side. · Repeat this 10 times. Do these exercises 2 times a day until the vertigo is gone. When should you call for help? Call 911 anytime you think you may need emergency care. For example, call if:    · You passed out (lost consciousness).     · You have symptoms of a stroke. These may include:  ? Sudden numbness, tingling, weakness, or loss of movement in your face, arm, or leg, especially on only one side of your body.   ? Sudden vision changes. ? Sudden trouble speaking. ? Sudden confusion or trouble understanding simple statements. ? Sudden problems with walking or balance. ? A sudden, severe headache that is different from past headaches. Call your doctor now or seek immediate medical care if:    · Vertigo occurs with a fever, a headache, or ringing in your ears.     · You have new or increased nausea and vomiting. Watch closely for changes in your health, and be sure to contact your doctor if:    · Vertigo gets worse or happens more often.     · Vertigo has not gotten better after 2 weeks. Where can you learn more? Go to http://www.gray.com/  Enter C986 in the search box to learn more about \"Vertigo: Care Instructions. \"  Current as of: September 8, 2021               Content Version: 13.2  © 6718-9576 [employer]. Care instructions adapted under license by [employer] (which disclaims liability or warranty for this information). If you have questions about a medical condition or this instruction, always ask your healthcare professional. [place] [address] any warranty or liability for your use of this information.

## 2022-07-11 NOTE — PROGRESS NOTES
Identified pt with two pt identifiers(name and ). Chief Complaint   Patient presents with    Other     balance issues and eyes are drifting so patient is having trouble seeing        Health Maintenance Due   Topic    COVID-19 Vaccine (1)    Depression Monitoring     DTaP/Tdap/Td series (1 - Tdap)    Cervical cancer screen        Wt Readings from Last 3 Encounters:   22 115 lb (52.2 kg)   18 118 lb 2.7 oz (53.6 kg)   17 113 lb (51.3 kg)     Temp Readings from Last 3 Encounters:   22 98.6 °F (37 °C) (Temporal)   18 97.4 °F (36.3 °C)   17 98.7 °F (37.1 °C) (Oral)     BP Readings from Last 3 Encounters:   22 102/68   18 113/66   17 125/88     Pulse Readings from Last 3 Encounters:   22 96   18 77   17 (!) 56         Learning Assessment:  :     Learning Assessment 4/15/2016   PRIMARY LEARNER Patient   HIGHEST LEVEL OF EDUCATION - PRIMARY LEARNER  GRADUATED HIGH SCHOOL OR GED   BARRIERS PRIMARY LEARNER NONE   CO-LEARNER CAREGIVER No   PRIMARY LANGUAGE ENGLISH   LEARNER PREFERENCE PRIMARY DEMONSTRATION     READING     LISTENING   ANSWERED BY patient   RELATIONSHIP SELF       Depression Screening:  :     3 most recent PHQ Screens 2022   Little interest or pleasure in doing things Not at all   Feeling down, depressed, irritable, or hopeless Not at all   Total Score PHQ 2 0       Fall Risk Assessment:  :     No flowsheet data found. Abuse Screening:  :     Abuse Screening Questionnaire 2022   Do you ever feel afraid of your partner? N N   Are you in a relationship with someone who physically or mentally threatens you? N N   Is it safe for you to go home?  Y Y       Coordination of Care Questionnaire:  :     1) Have you been to an emergency room, urgent care clinic since your last visit? no   Hospitalized since your last visit? no             2) Have you seen or consulted any other health care providers outside of [facility] since your last visit? no  (Include any pap smears or colon screenings in this section.)    3) Do you have an Advance Directive on file? no  Are you interested in receiving information about Advance Directives? yes    Patient is accompanied by N/A I have received verbal consent from Jane De La Cruz to discuss any/all medical information while they are present in the room. 4.  For patients aged 39-70: Has the patient had a colonoscopy / FIT/ Cologuard? NA - based on age      If the patient is female:    11. For patients aged 41-77: Has the patient had a mammogram within the past 2 years? NA - based on age or sex      10. For patients aged 21-65: Has the patient had a pap smear?  No

## 2022-07-11 NOTE — PROGRESS NOTES
HISTORY OF PRESENT ILLNESS  Jenny Shrestha is a 39 y.o. female. HPI  Pt last seen virtual visit Oct 2020. She did not see Neurology at that time as recommended. Now C/O new symptoms: disconjugate gaze, diplopia, poor balance to point of falls, bad headaches that start on R posterior neck, sometimes bad vertigo. Eye symptoms started a week ago. She is over due for eye exam, too. Review of Systems   Eyes: Positive for double vision. Musculoskeletal: Positive for falls. Neurological: Positive for dizziness and headaches. Visit Vitals  /68 (BP 1 Location: Left arm, BP Patient Position: Sitting, BP Cuff Size: Adult)   Pulse 96   Temp 98.6 °F (37 °C) (Temporal)   Resp 20   Ht 5' 6\" (1.676 m)   Wt 115 lb (52.2 kg)   SpO2 98%   BMI 18.56 kg/m²       Physical Exam  Vitals reviewed. Constitutional:       General: She is not in acute distress. HENT:      Right Ear: Tympanic membrane and ear canal normal.      Left Ear: Ear canal normal.   Eyes:      Extraocular Movements:      Right eye: Abnormal extraocular motion present. Conjunctiva/sclera: Conjunctivae normal.      Comments: Left upper lid droop compared to right upper lid. Neck:     Cardiovascular:      Rate and Rhythm: Normal rate and regular rhythm. Heart sounds: Normal heart sounds. Pulmonary:      Effort: Pulmonary effort is normal.      Breath sounds: Normal breath sounds. Musculoskeletal:      Cervical back: Tenderness present. Muscular tenderness present. Neurological:      Mental Status: She is alert and oriented to person, place, and time. Cranial Nerves: Cranial nerve deficit and facial asymmetry present. ASSESSMENT and PLAN    ICD-10-CM ICD-9-CM    1. Facial asymmetry  Q67.0 754.0 CT HEAD W WO CONT      REFERRAL TO NEUROLOGY   2. Diplopia  H53.2 368.2 REFERRAL TO NEUROLOGY      REFERRAL TO OPHTHALMOLOGY   3. Nonintractable episodic headache, unspecified headache type  R51.9 784.0    4.  Neck pain  M54.2 723.1    5. Vertigo  R42 780.4      Pt needs urgent Neurology referral.  Will fax note. Order CT head W WO cont.   Refer to Ophthalmology

## 2022-07-12 NOTE — TELEPHONE ENCOUNTER
Patient was scheduled with Dr. Jovita Garay office for September. Called Dr. Jovita Garay office and patient is now scheduled for July 14 at 10 am. I tried to get appt for right after her CT scan but the only availability that had was the 14th, so I scheduled it. I called patient and let her know to arrive 15 early. She understood.

## 2022-07-14 ENCOUNTER — HOSPITAL ENCOUNTER (OUTPATIENT)
Dept: MRI IMAGING | Age: 37
Discharge: HOME | End: 2022-07-14
Attending: PSYCHIATRY & NEUROLOGY
Payer: MEDICAID

## 2022-07-14 ENCOUNTER — TELEPHONE (OUTPATIENT)
Dept: NEUROLOGY | Age: 37
End: 2022-07-14

## 2022-07-14 ENCOUNTER — OFFICE VISIT (OUTPATIENT)
Dept: NEUROLOGY | Age: 37
End: 2022-07-14
Payer: MEDICAID

## 2022-07-14 VITALS
DIASTOLIC BLOOD PRESSURE: 82 MMHG | HEART RATE: 82 BPM | RESPIRATION RATE: 18 BRPM | OXYGEN SATURATION: 99 % | WEIGHT: 116 LBS | SYSTOLIC BLOOD PRESSURE: 122 MMHG | BODY MASS INDEX: 18.72 KG/M2

## 2022-07-14 DIAGNOSIS — H53.2 DIPLOPIA: ICD-10-CM

## 2022-07-14 DIAGNOSIS — R90.89 ABNORMAL BRAIN MRI: Primary | ICD-10-CM

## 2022-07-14 DIAGNOSIS — G44.89 OTHER HEADACHE SYNDROME: ICD-10-CM

## 2022-07-14 DIAGNOSIS — R55 SYNCOPE AND COLLAPSE: ICD-10-CM

## 2022-07-14 DIAGNOSIS — H51.8 DYSCONJUGATE GAZE: ICD-10-CM

## 2022-07-14 DIAGNOSIS — H53.9 VISUAL DISTURBANCE: ICD-10-CM

## 2022-07-14 DIAGNOSIS — H02.402 PTOSIS OF LEFT EYELID: ICD-10-CM

## 2022-07-14 DIAGNOSIS — H51.8 DYSCONJUGATE GAZE: Primary | ICD-10-CM

## 2022-07-14 PROCEDURE — 99204 OFFICE O/P NEW MOD 45 MIN: CPT | Performed by: PSYCHIATRY & NEUROLOGY

## 2022-07-14 PROCEDURE — 74011250636 HC RX REV CODE- 250/636: Performed by: PSYCHIATRY & NEUROLOGY

## 2022-07-14 PROCEDURE — A9575 INJ GADOTERATE MEGLUMI 0.1ML: HCPCS | Performed by: PSYCHIATRY & NEUROLOGY

## 2022-07-14 PROCEDURE — 70553 MRI BRAIN STEM W/O & W/DYE: CPT

## 2022-07-14 RX ORDER — GADOTERATE MEGLUMINE 376.9 MG/ML
9 INJECTION INTRAVENOUS
Status: COMPLETED | OUTPATIENT
Start: 2022-07-14 | End: 2022-07-14

## 2022-07-14 RX ADMIN — GADOTERATE MEGLUMINE 9 ML: 376.9 INJECTION INTRAVENOUS at 12:02

## 2022-07-14 NOTE — TELEPHONE ENCOUNTER
Patient would like a call back from the nurse or doctor regarding her MRI results.     Please contact

## 2022-07-14 NOTE — PATIENT INSTRUCTIONS
RESULT POLICY      If we have ordered testing for you, know that; \"NO NEWS IS GOOD NEWS! \"     It is our policy that we no longer call patients with results, nor do we  give test results over the phone. We schedule follow up appointments so that your results can be discussed in person. This allows you to address any questions you have regarding the results. If you choose to go to an imaging center outside of [facility], it is your responsibility to bring imaging report and disc to follow up appointment. If something of concern is revealed on your test, we will contact you to discuss the matter and if needed schedule a sooner follow up appointment. Additionally, results may be found by using the My Chart feature and one of our patient service representatives at the  can give you instructions on how to access this feature to utilize our electronic medical record system. Thank you for your understanding. [facility]   Statement to Patients  April 1, 2014      In an effort to ensure the large volume of patient prescription refills is processed in the most efficient and expeditious manner, we are asking our patients to assist us by calling your Pharmacy for all prescription refills, this will include also your  Mail Order Pharmacy. The pharmacy will contact our office electronically to continue the refill process. Please do not wait until the last minute to call your pharmacy. We need at least 48 hours (2days) to fill prescriptions. We also encourage you to call your pharmacy before going to  your prescription to make sure it is ready. With regard to controlled substance prescription refill requests (narcotic refills) that need to be picked up at our office, we ask your cooperation by providing us with at least 72 hours (3days) notice that you will need a refill.     We will not refill narcotic prescription refill requests after 4:00pm on any weekday, Monday through Thursday, or after 2:00pm on Fridays, or on the weekends. We encourage everyone to explore another way of getting your prescription refill request processed using [employer], our patient web portal through our electronic medical record system. [employer] is an efficient and effective way to communicate your medication request directly to the office and  downloadable as an jose on your smart phone . [employer] also features a review functionality that allows you to view your medication list as well as leave messages for your physician. Are you ready to get connected? If so please review the attatched instructions or speak to any of our staff to get you set up right away! Thank you so much for your cooperation. Should you have any questions please contact our Practice Administrator.

## 2022-07-14 NOTE — PROGRESS NOTES
[facility] and [address] at [facility] at [address] [facility], [address] [facility] [address] [facility], [address] [place] [address]  [phone] Office  [phone]           Referring: Pavithra Mahajan, [address] [place] [address] [address]  [address]  [facility],  [address] [facility]    Chief Complaint   Patient presents with    New Patient    Dizziness     with imbalance    Headache     started yrs ago, but worsened over recent months. lost consciousness twice.  Vision Change     unable to focus vision w/o causing sig headpain last week     70-year-old lady who presents today for initial neurologic consultation regarding progressive change in her neurologic status. She says that she started having headaches about 2 years ago and the headaches have intensified and she has had significant change in her neurologic status. Along with increasing headaches a week ago she started to have dizziness described as vertigo. She also noticed that she unable to focus her eyes. She started to have diplopia. She says that her eyes are moving together. Her left eye started to become droopy as well. She also had 2 episodes of loss of consciousness. With a loss of consciousness she just felt a rush of blood and she felt lightheadedness and she tried to ask her 6year-old son for help but she was unable to speak. She was unconscious for just a few seconds. She did not bite her tongue. She did not lose her water. Her son did not say anything about any shaking. She never had this happen before. She did have a headache at that time. Headache is located in the right posterior region and in her neck. She does have a history of a left cholesteatoma resection by Dr. Myles Green back around 2010 or so done at an outpatient surgical center. She has not had any recent imaging.   She says that when her headache started a couple years ago she was referred to neurology but she never kept up with that appointment. Again symptoms have been progressively worse. She is never had any visual difficulty like this before. Record review finds the patient was seen by Dr. Rohith Farnsworth virtually on  of this year for disconjugate gaze, diplopia and poor balance with falls as well as vertigo. She had left upper lid drooping on exam.  She was sent for head CT referred to neurology and ophthalmology. CT scan is scheduled for tomorrow    Laboratory analysis most recently was 6765  Metabolic panel unremarkable  CBC unremarkable  HIV nonreactive  Hepatitis C negative  TSH normal  RPR nonreactive    Past Medical History:   Diagnosis Date    Anxiety     Bipolar affect, depressed     Eating disorder, anorexia nervosa 2006    Has been hospitalized for this in the past    Fibromyalgia     IBS (irritable bowel syndrome)     Kidney stones     Migraine     Opiate addiction ([facility] [address] 2015    Per [facility] - Went to pain clinic    PTSD (post-traumatic stress disorder) 2015    Dx at [facility] 2015       Past Surgical History:   Procedure Laterality Date    HX LEEP PROCEDURE  2015    precancerous cells    HX TONSIL AND ADENOIDECTOMY      HX WISDOM TEETH EXTRACTION         Current Outpatient Medications   Medication Sig Dispense Refill    ibuprofen (MOTRIN) 400 mg tablet Take 400 mg by mouth every six (6) hours as needed for Pain.  carli's wort 300 mg tab Take  by mouth. Allergies   Allergen Reactions    Keflex [Cephalexin] Other (comments)     Mini seizure.         Social History     Tobacco Use    Smoking status: Former Smoker     Quit date: 2/15/2006     Years since quittin.4    Smokeless tobacco: Never Used   Vaping Use    Vaping Use: Never used   Substance Use Topics    Alcohol use: No     Alcohol/week: 0.0 standard drinks    Drug use: No     Comment: Has used marijuana in the past       Family History   Problem Relation Age of Onset    Psychiatric Disorder Mother     Elevated Lipids Mother     Psychiatric Disorder Sister     SLE Paternal Grandmother        Review of Systems  Pertinent positives and negatives as noted. Examination  Visit Vitals  /82 (BP 1 Location: Left upper arm, BP Patient Position: Sitting, BP Cuff Size: Adult)   Pulse 82   Resp 18   Wt 52.6 kg (116 lb)   SpO2 99%   BMI 18.72 kg/m²   She is a thin lady. She is appropriately dressed and groomed. No icterus. Oropharynx is clear and moist.  She is awake alert and oriented. Speech and language normal.  Cognition is normal.  In primary gaze the left globe will drift laterally. She is able to get through full versions. She is unable to hold the left globe in mid position without it drifting laterally. An upward gaze the right globe again drifts laterally. She has left-sided ptosis and a left-sided facial droop as well. There is no nystagmus. Tongue is midline. Palate is midline. She has no pronation and no drift. She resists fully in the upper and lower extremities in all muscle groups throughout testing. Reflexes are symmetrical.  No pathologic reflex. No ataxia.     Impression/Plan  68-year-old lady with progressive increase in headaches with disconjugate gaze as well as 3rd nerve weakness right globe as evidenced by the exotropia and inability to hold right globe in mid position without lateral deviation but then also left-sided facial weakness and ptosis demonstrating both 3rd and 7th nerve weakness on the left also with symptoms of vertigo and syncope/collapse and certainly with the progressive nature of symptom along with this headache concern is for space-occupying lesion such as a mass and we need to get stat MRI and I have asked my staff to arrange that  Will also evaluate for vascular etiology versus demyelinating versus other  We will add carotid Doppler EEG and a visual evoked but the most pressing issue is to get the imaging  We will follow-up on studies and make further recommendations    Velta Burkitt, MD          This note was created using voice recognition software. Despite editing, there may be syntax errors.

## 2022-07-14 NOTE — TELEPHONE ENCOUNTER
S/w pt, she states that Dr. Eloisa Murray office called.   She has been scheduled with him for 7/18/22 at 1:15 pm.

## 2022-07-14 NOTE — TELEPHONE ENCOUNTER
----- Message from Sis Oneal MD sent at 7/14/2022  2:27 PM EDT -----  Pt has large mass as described  Pls get into neurosurgery [place] if possible  ----- Message -----  From: Corky Hager Results In  Sent: 7/14/2022   1:01 PM EDT  To: Sis Oneal MD

## 2022-07-14 NOTE — TELEPHONE ENCOUNTER
Called Dr. Americo Bustos office [phone]. Faxed referral, MRI report, and OV note to nurse Patrica Ba at [phone] for Dr. Kelli Tripp to review.   She will call back with any questions

## 2022-08-02 ENCOUNTER — TELEPHONE (OUTPATIENT)
Dept: FAMILY MEDICINE CLINIC | Age: 37
End: 2022-08-02

## 2022-08-02 NOTE — TELEPHONE ENCOUNTER
Pt had surgery on Friday 7/29/22 and will need [facility] and wants to know if dr Gurpreet Langley will follow and sign orders.     Call pt at [phone]

## 2022-08-02 NOTE — TELEPHONE ENCOUNTER
I called Home Care. We have no additional info since pt had abn MRI done after her consult with Neurology Dr. Gurpreet Garcia. I needs info faxed to me.

## 2022-08-02 NOTE — TELEPHONE ENCOUNTER
Livier Canchola with [facility] wanted to make sure Dr. Glen Hurtado would follow patient for skilled nursing and PT and would sign orders     [phone] ex.  [phone]    Okay to leave VM

## 2022-08-18 ENCOUNTER — TELEPHONE (OUTPATIENT)
Dept: FAMILY MEDICINE CLINIC | Age: 37
End: 2022-08-18

## 2022-08-18 NOTE — TELEPHONE ENCOUNTER
Juliane ON Monday TO CALL AND ADVISE    Heidi with [facility] is calling in regards to pt's plan of care and it was faxed back stating pt needs an appt first and they called and previously asked if dr Sneha Jeong would follow for home care    Call heidi at [phone]

## 2022-08-22 ENCOUNTER — TELEPHONE (OUTPATIENT)
Dept: FAMILY MEDICINE CLINIC | Age: 37
End: 2022-08-22

## 2022-08-22 NOTE — TELEPHONE ENCOUNTER
Please let [address] know I will follow. 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 Need a face to face visit.

## 2022-08-23 ENCOUNTER — OFFICE VISIT (OUTPATIENT)
Dept: NEUROLOGY | Age: 37
End: 2022-08-23

## 2022-08-23 ENCOUNTER — TELEPHONE (OUTPATIENT)
Dept: FAMILY MEDICINE CLINIC | Age: 37
End: 2022-08-23

## 2022-08-23 DIAGNOSIS — R90.89 ABNORMAL BRAIN MRI: Primary | ICD-10-CM

## 2022-08-23 NOTE — PROGRESS NOTES
EMG/ NCS Report  DRUG REHABILITATION  - [facility]ID[facility]  [facility] [address] [facility], [address] [place] [address] [address]   Ph: [phone]   FAX: [phone]/ [phone]  Test Date:  2019      Test Date:  2022    Patient: Tessy Lynn : 1985 Physician: Enma Zepeda   Sex: Female Height: ' \" Ref Phys: Joycelyn Wilder MD   ID#: [id number] Weight:  lbs. Technician: Mir Varela     Patient History / Exam:  CC:Diplopia,ptosis of the lt. eye lid,visual disturbance.           EMG & NCV Findings:    Impression:        ___________________________  JAMES RAYMUNDO MD      VEP 1ch     Trace NR N75 (ms) P100 (ms) N145 (ms) H32-N631 (µV)   Norm   <117     Ch1 : O1-Cz : L    69.9 107.0 146.48 2.82   Ch1 : O2-Cz : R    69.9 110.2 145.70 12.29   L-R Norm   <7     L-R  0.0 3.2 0.80 9.47                   Waveforms:

## 2022-08-23 NOTE — TELEPHONE ENCOUNTER
Called patient to let her know that Dr. Vashti Dias would follow her aftercare from surgery. Called Heidi and otilio detailed VM letting her know that Dr. Vashti Dias would be the one following her care after surgery. Patient has an appointment with Dr. Cristhian Munguia, but I told her that I would have someone reach out to her to get her rescheduled with Dr. Vashti Dias for a hospital follow up.

## 2022-08-30 ENCOUNTER — OFFICE VISIT (OUTPATIENT)
Dept: FAMILY MEDICINE CLINIC | Age: 37
End: 2022-08-30
Payer: MEDICAID

## 2022-08-30 VITALS
RESPIRATION RATE: 16 BRPM | HEIGHT: 66 IN | TEMPERATURE: 98.2 F | OXYGEN SATURATION: 98 % | BODY MASS INDEX: 18.8 KG/M2 | SYSTOLIC BLOOD PRESSURE: 114 MMHG | WEIGHT: 117 LBS | DIASTOLIC BLOOD PRESSURE: 62 MMHG | HEART RATE: 74 BPM

## 2022-08-30 DIAGNOSIS — M62.838 NECK MUSCLE SPASM: Primary | ICD-10-CM

## 2022-08-30 DIAGNOSIS — D35.2 PROLACTINOMA (HCC): ICD-10-CM

## 2022-08-30 DIAGNOSIS — G44.229 CHRONIC TENSION-TYPE HEADACHE, NOT INTRACTABLE: ICD-10-CM

## 2022-08-30 PROBLEM — R00.1 SINUS BRADYCARDIA BY ELECTROCARDIOGRAM: Status: ACTIVE | Noted: 2022-07-29

## 2022-08-30 PROBLEM — E87.1 HYPONATREMIA: Status: ACTIVE | Noted: 2022-08-06

## 2022-08-30 PROCEDURE — 99214 OFFICE O/P EST MOD 30 MIN: CPT | Performed by: INTERNAL MEDICINE

## 2022-08-30 RX ORDER — CHROMIUM PICOLINATE 200 MCG
1 TABLET ORAL 2 TIMES DAILY
COMMUNITY

## 2022-08-30 RX ORDER — BUTALBITAL, ACETAMINOPHEN AND CAFFEINE 50; 325; 40 MG/1; MG/1; MG/1
1 TABLET ORAL
Qty: 20 TABLET | Refills: 0 | Status: SHIPPED | OUTPATIENT
Start: 2022-08-30 | End: 2022-08-30 | Stop reason: SDUPTHER

## 2022-08-30 RX ORDER — ONDANSETRON 4 MG/1
4 TABLET, FILM COATED ORAL
COMMUNITY
Start: 2022-08-07

## 2022-08-30 RX ORDER — BUTALBITAL, ACETAMINOPHEN AND CAFFEINE 50; 325; 40 MG/1; MG/1; MG/1
1 TABLET ORAL
Qty: 20 TABLET | Refills: 0 | Status: SHIPPED | OUTPATIENT
Start: 2022-08-30 | End: 2022-09-21 | Stop reason: SDUPTHER

## 2022-08-30 RX ORDER — CYCLOBENZAPRINE HCL 10 MG
10 TABLET ORAL
Qty: 10 TABLET | Refills: 3 | Status: SHIPPED | OUTPATIENT
Start: 2022-08-30

## 2022-08-30 RX ORDER — CABERGOLINE 0.5 MG/1
0.5 TABLET ORAL 2 TIMES WEEKLY
COMMUNITY
Start: 2022-08-11 | End: 2023-03-16

## 2022-08-30 NOTE — PROCEDURES
[facility]   EEG Report    Patient: Elisabeth Mejía  1985  Date of Service: 8/23/22  Referring:  Ana Silvestre    An EEG is requested in this 80-year-old woman to evaluate for epileptiform abnormalities. Medications said to include Motrin and [employer]'s wort    This tracing is obtained during the awake state. During wakefulness there are intermittent runs of posteriorly dominant and symmetric low to medium amplitude 10 cps activities which attenuate with eye opening. Lower voltage faster frequency activities are seen symmetrically over the anterior head regions. Hyperventilation is not performed    Intermittent photic stimulation little alters the tracing. Sleep is not attained.     Interpretation  This EEG recorded during the awake state is normal.    Ana Silvestre MD

## 2022-08-30 NOTE — PROGRESS NOTES
CC:  Chief Complaint   Patient presents with    Neck Pain     And into RT shoulder- Been getting worse over the past 3 yrs        Assessment/ Plan:   Diagnoses and all orders for this visit:    1. Neck muscle spasm  -     REFERRAL TO PHYSICAL THERAPY  -     cyclobenzaprine (FLEXERIL) 10 mg tablet; Take 1 Tablet by mouth nightly. -     XR SPINE CERV 4 OR 5 V; Future  -     butalbital-acetaminophen-caffeine (FIORICET, ESGIC) -40 mg per tablet; Take 1 Tablet by mouth two (2) times daily as needed for Headache for up to 10 days. 2. Prolactinoma ([facility] [address]   Reviewed in detail below and patient continues to follow up with neurology, neurosurgery and endocrinology. 3. Chronic tension-type headache, not intractable  -     butalbital-acetaminophen-caffeine (FIORICET, ESGIC) -40 mg per tablet; Take 1 Tablet by mouth two (2) times daily as needed for Headache for up to 10 days. I have discussed the diagnosis with the patient and the intended treatment plan as seen in the above orders. The patient has received an after-visit summary and questions were answered concerning future plans. Asked to return should symptoms worsen or not improve with treatment. Any pending labs and studies will be relayed to patient when they become available. Pt verbalizes understanding of plan of care and denies further questions or concerns at this time. Subjective:   Elisabeth Mejía is a 39 y.o. female who presents for follow up and to discuss ongoing issues after diagnosis of Prolactinoma. I have reviewed her chart in preparation for this evaluation and also during her visit. She is followed by at [facility] Neurosurgery by Dr. Mumtaz Knapp. Neurology: Dr. Rhina Jaquez. See's endocrinology at [facility] as well.      Per his/her note:  Mrs. 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 She was evaluated in the neurosurgery clinic and due to the severity of her symptoms was recommended to undergo a transsphenoidal resection of the adenoma to reduce mass-effect in the hopes of optimizing the patient's recovery of neurological function after initiation of cabergoline therapy, as well as to obtain tissue diagnosis of the tumor. She underwent sublabial transsphenoidal resection of the adenoma on June 29, 2022 and postoperatively did fairly well. She had epistaxis for several days which did ultimately improve. She did have transient SIADH for which endocrinology weighed in and this has subsequently completely resolved without requiring medical therapies. She was initiated on cabergoline therapy for the prolactinoma postoperatively. She most recently was seen by endocrinology this morning where her dose of cabergoline was increased. She did present back to the emergency department recently with headache and had a CT scan of the head which was unremarkable. She was treated with some pain medications and discharged home. Today Mrs. Chrissie Ayala reports that overall she is continuing to improve. She feels that her double vision is improving as well as her visual acuity. She is scheduled to see neuro-ophthalmology in November of this year. She says that she occasionally has some bloody drainage from the nose but it is continually improving. She asks questions about positioning restriction and is reassured. She states that her emergency department visit was more that she was surprised to have had headache after some time of doing well but that overall her pain is much better when she is essentially infrequently using over-the-counter medications as needed at this point.     Today at [facility] she reports the following and has the following concerns:  she has continued to have neck pain of unclear etiology. It is not felt to be associated with the above issues. She was actually having similar pain prior to identification of the prolactinoma. She feels it intensely and would like relief. We discussed that a prior xray done in 2020 was negative, but will send her for repeat xrays. Also, we will start her on Flexril for muscle relaxation and also give a referral to PT. Also, will give a short course of Fioricet which has helped the headaches associated with the muscle pain. Exam is noted for a positive Spurling. Very tight traps and neck muscles. Recent labs done by Endo in care everywhere. Noted only for mild anemia. Cortisol and BMP are normal.   TSH normal range. HISTORICAL  PMH, PSH, FHX, SOCHX, ALLERGIES and MED were reviewed and updated today. Review of Systems  Review of Systems   Eyes:  Positive for double vision (very mild improving). Musculoskeletal:  Positive for back pain, myalgias and neck pain. All other systems reviewed and are negative. Objective:     Vitals:    08/30/22 1301   BP: 114/62   Pulse: 74   Resp: 16   Temp: 98.2 °F (36.8 °C)   TempSrc: Temporal   SpO2: 98%   Weight: 117 lb (53.1 kg)   Height: 5' 6\" (1.676 m)       Physical Exam  Constitutional:       Appearance: Normal appearance. HENT:      Head: Normocephalic and atraumatic. Cardiovascular:      Rate and Rhythm: Normal rate and regular rhythm. Pulses: Normal pulses. Heart sounds: Normal heart sounds. Pulmonary:      Effort: Pulmonary effort is normal.      Breath sounds: Normal breath sounds. Musculoskeletal:      Cervical back: Full passive range of motion without pain. Tenderness present. Muscular tenderness present. Neurological:      Mental Status: She is alert. Mental status is at baseline. Motor: Weakness (using a cane, but weakness is improved) present.      Trisha Alvarez MD  [facility]   08/30/22

## 2022-08-30 NOTE — PROGRESS NOTES
Identified pt with two pt identifiers(name and ). Chief Complaint   Patient presents with    Neck Pain     And into RT shoulder- Been getting worse over the past 3 yrs         Health Maintenance Due   Topic    COVID-19 Vaccine (1)    DTaP/Tdap/Td series (1 - Tdap)    Cervical cancer screen        Wt Readings from Last 3 Encounters:   22 117 lb (53.1 kg)   22 116 lb (52.6 kg)   22 115 lb (52.2 kg)     Temp Readings from Last 3 Encounters:   22 98.2 °F (36.8 °C) (Temporal)   22 98.6 °F (37 °C) (Temporal)   18 97.4 °F (36.3 °C)     BP Readings from Last 3 Encounters:   22 114/62   22 122/82   22 102/68     Pulse Readings from Last 3 Encounters:   22 74   22 82   22 96         Learning Assessment:  :     Learning Assessment 4/15/2016   PRIMARY LEARNER Patient   HIGHEST LEVEL OF EDUCATION - PRIMARY LEARNER  GRADUATED HIGH SCHOOL OR GED   BARRIERS PRIMARY LEARNER NONE   CO-LEARNER CAREGIVER No   PRIMARY LANGUAGE ENGLISH   LEARNER PREFERENCE PRIMARY DEMONSTRATION     READING     LISTENING   ANSWERED BY patient   RELATIONSHIP SELF       Depression Screening:  :     3 most recent PHQ Screens 2022   Little interest or pleasure in doing things Not at all   Feeling down, depressed, irritable, or hopeless Not at all   Total Score PHQ 2 0       Fall Risk Assessment:  :     No flowsheet data found. Abuse Screening:  :     Abuse Screening Questionnaire 2022   Do you ever feel afraid of your partner? N N   Are you in a relationship with someone who physically or mentally threatens you? N N   Is it safe for you to go home? Y Y       Coordination of Care Questionnaire:  :     1) Have you been to an emergency room, urgent care clinic since your last visit?  yes [facility] 22, 2022, 22   Hospitalized since your last visit? no             2) Have you seen or consulted any other health care providers outside of [facility] since your last visit? no  (Include any pap smears or colon screenings in this section.)    3) Do you have an Advance Directive on file? no  Are you interested in receiving information about Advance Directives? no    4. For patients aged 39-70: Has the patient had a colonoscopy / FIT/ Cologuard? NA - based on age      If the patient is female:    11. For patients aged 41-77: Has the patient had a mammogram within the past 2 years? NA - based on age or sex      10. For patients aged 21-65: Has the patient had a pap smear?  No

## 2022-09-01 ENCOUNTER — OFFICE VISIT (OUTPATIENT)
Dept: NEUROLOGY | Age: 37
End: 2022-09-01
Payer: MEDICAID

## 2022-09-01 ENCOUNTER — HOSPITAL ENCOUNTER (OUTPATIENT)
Dept: GENERAL RADIOLOGY | Age: 37
Discharge: HOME | End: 2022-09-01
Attending: NURSE PRACTITIONER
Payer: MEDICAID

## 2022-09-01 ENCOUNTER — HOSPITAL ENCOUNTER (OUTPATIENT)
Dept: GENERAL RADIOLOGY | Age: 37
Discharge: HOME | End: 2022-09-01
Attending: INTERNAL MEDICINE
Payer: MEDICAID

## 2022-09-01 VITALS
SYSTOLIC BLOOD PRESSURE: 108 MMHG | OXYGEN SATURATION: 98 % | TEMPERATURE: 97.5 F | DIASTOLIC BLOOD PRESSURE: 60 MMHG | HEART RATE: 75 BPM

## 2022-09-01 DIAGNOSIS — M54.2 NECK PAIN: Primary | ICD-10-CM

## 2022-09-01 DIAGNOSIS — M54.2 NECK PAIN: ICD-10-CM

## 2022-09-01 DIAGNOSIS — M62.838 NECK MUSCLE SPASM: ICD-10-CM

## 2022-09-01 DIAGNOSIS — R51.9 FACE PAIN: ICD-10-CM

## 2022-09-01 DIAGNOSIS — R51.9 INTRACTABLE HEADACHE, UNSPECIFIED CHRONICITY PATTERN, UNSPECIFIED HEADACHE TYPE: ICD-10-CM

## 2022-09-01 PROCEDURE — 99214 OFFICE O/P EST MOD 30 MIN: CPT | Performed by: NURSE PRACTITIONER

## 2022-09-01 PROCEDURE — 70150 X-RAY EXAM OF FACIAL BONES: CPT

## 2022-09-01 PROCEDURE — 72050 X-RAY EXAM NECK SPINE 4/5VWS: CPT

## 2022-09-02 ENCOUNTER — TELEPHONE (OUTPATIENT)
Dept: NEUROLOGY | Age: 37
End: 2022-09-02

## 2022-09-02 ENCOUNTER — TELEPHONE (OUTPATIENT)
Dept: FAMILY MEDICINE CLINIC | Age: 37
End: 2022-09-02

## 2022-09-02 ENCOUNTER — PATIENT MESSAGE (OUTPATIENT)
Dept: NEUROLOGY | Age: 37
End: 2022-09-02

## 2022-09-02 NOTE — PROGRESS NOTES
Please let patient know that her cervical spine films were completley normal at this time. She should consider PT as we discussed for her neck pain. It think that it is MSK and not bone related. Thanks!

## 2022-09-02 NOTE — PROGRESS NOTES
Nadia Kilgore is a 39 y.o. female who presents with the following  Chief Complaint   Patient presents with    Follow-up     Dysconjugate gaze, diplopia, ptosis LT eyelid       HPI    FU   Doing better post brain surgery with Dr. Jody Aden. She is feeling like things are getting better day to day   She underwent sublabial transsphenoidal resection of the adenoma on 2022   Following with Endocrine     Vision is getting better  Movement is getting better. Her kids even say so   She is having some neck and facial pain   She is not sure what this is from   Has a XR cervical spine today   Will add face. Allergies   Allergen Reactions    Keflex [Cephalexin] Other (comments)     Mini seizure. Current Outpatient Medications   Medication Sig    ondansetron hcl (ZOFRAN) 4 mg tablet Take 4 mg by mouth every eight (8) hours as needed. cabergoline (DOSTINEX) 0.5 mg tablet Take 0.5 mg by mouth two (2) times a week. ashwagandha root extract 300 mg cap Take 1 Capsule by mouth two (2) times a day. cyclobenzaprine (FLEXERIL) 10 mg tablet Take 1 Tablet by mouth nightly. butalbital-acetaminophen-caffeine (FIORICET, ESGIC) -40 mg per tablet Take 1 Tablet by mouth two (2) times daily as needed for Headache for up to 10 days. ibuprofen (MOTRIN) 400 mg tablet Take 400 mg by mouth every six (6) hours as needed for Pain. No current facility-administered medications for this visit.        Social History     Tobacco Use   Smoking Status Former    Types: Cigarettes    Quit date: 2/15/2006    Years since quittin.5   Smokeless Tobacco Never       Past Medical History:   Diagnosis Date    Anxiety     Bipolar affect, depressed     Eating disorder, anorexia nervosa 2006    Has been hospitalized for this in the past    Fibromyalgia     IBS (irritable bowel syndrome)     Kidney stones     Migraine     Opiate addiction ([facility] [address] 2015    Per [facility] - Went to pain clinic    PTSD (post-traumatic stress disorder) 2015    Dx at [facility] 2015       Past Surgical History:   Procedure Laterality Date    HX LEEP PROCEDURE  2015    precancerous cells    HX TONSIL AND ADENOIDECTOMY      HX WISDOM TEETH EXTRACTION         Family History   Problem Relation Age of Onset    Psychiatric Disorder Mother     Elevated Lipids Mother     Psychiatric Disorder Sister     SLE Paternal Grandmother        Social History     Socioeconomic History    Marital status:    Tobacco Use    Smoking status: Former     Types: Cigarettes     Quit date: 2/15/2006     Years since quittin.5    Smokeless tobacco: Never   Vaping Use    Vaping Use: Never used   Substance and Sexual Activity    Alcohol use: No     Alcohol/week: 0.0 standard drinks    Drug use: No     Comment: Has used marijuana in the past    Sexual activity: Yes     Partners: Male     Birth control/protection: None       Review of Systems   Eyes:  Positive for photophobia. Negative for blurred vision and double vision. Respiratory:  Negative for wheezing. Cardiovascular:  Negative for chest pain and palpitations. Gastrointestinal:  Negative for nausea and vomiting. Neurological:  Positive for dizziness, weakness and headaches. Negative for seizures and loss of consciousness. Remainder of comprehensive review is negative. Physical Exam :    Visit Vitals  /60   Pulse 75   Temp 97.5 °F (36.4 °C)   LMP  (LMP Unknown) Comment: has not had in 7 years   SpO2 98%       General: Well defined, nourished, and groomed individual in no acute distress. Neck: Supple, nontender, no bruits, no pain with resistance to active range of motion. Musculoskeletal: Extremities revealed no edema and had full range of motion of joints. Psych: Good mood and bright affect    NEUROLOGICAL EXAMINATION:    Mental Status: Alert and oriented to person, place, and time    Cranial Nerves:    II, III, IV, VI: Visual acuity grossly intact.  Visual fields are normal.    Pupils are equal, round, and reactive to light and accommodation. Extra-ocular movements are full and fluid. Fundoscopic exam was benign, no ptosis or nystagmus. V-XII: Hearing is grossly intact. Facial features are symmetric, with normal sensation and strength. The palate rises symmetrically and the tongue protrudes midline. Sternocleidomastoids 5/5. Motor Examination: Normal tone, bulk, and strength, 4/5 muscle strength throughout. Coordination: Finger to nose was normal. No resting or intention tremor    Gait and Station: using cane with ambulation     Reflexes: DTRs 2+ throughout. Results for orders placed or performed in visit on 30/85/29   METABOLIC PANEL, COMPREHENSIVE   Result Value Ref Range    Glucose 75 65 - 99 mg/dL    BUN 6 6 - 20 mg/dL    Creatinine 0.63 0.57 - 1.00 mg/dL    GFR est non- >59 mL/min/1.73    GFR est  >59 mL/min/1.73    BUN/Creatinine ratio 10 9 - 23    Sodium 141 134 - 144 mmol/L    Potassium 3.8 3.5 - 5.2 mmol/L    Chloride 101 96 - 106 mmol/L    CO2 25 20 - 29 mmol/L    Calcium 9.6 8.7 - 10.2 mg/dL    Protein, total 7.5 6.0 - 8.5 g/dL    Albumin 4.8 3.8 - 4.8 g/dL    GLOBULIN, TOTAL 2.7 1.5 - 4.5 g/dL    A-G Ratio 1.8 1.2 - 2.2    Bilirubin, total 0.3 0.0 - 1.2 mg/dL    Alk.  phosphatase 75 39 - 117 IU/L    AST (SGOT) 22 0 - 40 IU/L    ALT (SGPT) 18 0 - 32 IU/L   CBC W/O DIFF   Result Value Ref Range    WBC 5.0 3.4 - 10.8 x10E3/uL    RBC 4.16 3.77 - 5.28 x10E6/uL    HGB 12.6 11.1 - 15.9 g/dL    HCT 35.5 34.0 - 46.6 %    MCV 85 79 - 97 fL    MCH 30.3 26.6 - 33.0 pg    MCHC 35.5 31.5 - 35.7 g/dL    RDW 12.4 11.7 - 15.4 %    PLATELET 412 727 - 901 x10E3/uL   HIV 1/2 AG/AB, 4TH GENERATION,W RFLX CONFIRM   Result Value Ref Range    HIV SCREEN 4TH GENERATION WRFX Non Reactive Non Reactive   HEPATITIS C AB   Result Value Ref Range    Hep C Virus Ab <0.1 0.0 - 0.9 s/co ratio   THYROID CASCADE PROFILE   Result Value Ref Range    TSH 1.350 0.450 - 4.500 uIU/mL   RPR   Result Value Ref Range    RPR Non Reactive Non Reactive       Orders Placed This Encounter    XR FACIAL BONES MIN 3 V     Standing Status:   Future     Number of Occurrences:   1     Standing Expiration Date:   10/1/2023     Order Specific Question:   Is Patient Pregnant? Answer:   No       1. Neck pain    2. Face pain    3. Intractable headache, unspecified chronicity pattern, unspecified headache type      Doing better post surgery with Dr. Regan Trevino  She feels like things are slowing down and getting better  She has some neck pain, and cervical spine pain, face pain. She will continue to track symptoms. FU after. Can consider CT face.                This note will not be viewable in [facility]

## 2022-09-02 NOTE — TELEPHONE ENCOUNTER
----- Message from Kaye Garg MD sent at 9/2/2022  8:38 AM EDT -----  Please let patient know that her cervical spine films were completley normal at this time. She should consider PT as we discussed for her neck pain. It think that it is MSK and not bone related. Thanks!

## 2022-09-03 NOTE — TELEPHONE ENCOUNTER
The neck and face looked good on the XRays     If she wants, the next step would be a CT of the facial bones and jaw. .or a muscle relaxer for the neck and jaw muscles. Let me know if she feels like that is needed.

## 2022-09-05 NOTE — PROCEDURES
[facility] [place]   Visual Evoked Potential Report    Patient: Brett Cui  1985  Date of Service: 8/23/2022  Referring:  Naty Garcia      Bilateral full field pattern reversal visual evoked potential is performed. Waveforms are appropriate for interpretation. Absolute latency of the P100 is normal bilaterally.       Normal study    Naty Garcia MD

## 2022-09-06 ENCOUNTER — TELEPHONE (OUTPATIENT)
Dept: NEUROLOGY | Age: 37
End: 2022-09-06

## 2022-09-07 DIAGNOSIS — M62.838 NECK MUSCLE SPASM: ICD-10-CM

## 2022-09-07 DIAGNOSIS — G44.229 CHRONIC TENSION-TYPE HEADACHE, NOT INTRACTABLE: ICD-10-CM

## 2022-09-07 DIAGNOSIS — H02.402 PTOSIS OF LEFT EYELID: ICD-10-CM

## 2022-09-07 DIAGNOSIS — H51.8 DYSCONJUGATE GAZE: ICD-10-CM

## 2022-09-07 DIAGNOSIS — R51.9 FACE PAIN: Primary | ICD-10-CM

## 2022-09-07 RX ORDER — BUTALBITAL, ACETAMINOPHEN AND CAFFEINE 50; 325; 40 MG/1; MG/1; MG/1
TABLET ORAL
Qty: 20 TABLET | Refills: 0 | OUTPATIENT
Start: 2022-09-07

## 2022-09-07 NOTE — TELEPHONE ENCOUNTER
Ordered a CT of the face and maxilarry     For dystonia, would have to set up with Dr. Paulino Mello to evaluate closer if she wants to do that      Let me know

## 2022-09-14 ENCOUNTER — OFFICE VISIT (OUTPATIENT)
Dept: NEUROLOGY | Age: 37
End: 2022-09-14
Payer: MEDICAID

## 2022-09-14 VITALS
SYSTOLIC BLOOD PRESSURE: 110 MMHG | OXYGEN SATURATION: 98 % | HEART RATE: 74 BPM | DIASTOLIC BLOOD PRESSURE: 70 MMHG | RESPIRATION RATE: 20 BRPM

## 2022-09-14 DIAGNOSIS — M24.80 GENERALIZED HYPERMOBILITY OF JOINTS: ICD-10-CM

## 2022-09-14 DIAGNOSIS — M54.2 CERVICALGIA: Primary | ICD-10-CM

## 2022-09-14 PROCEDURE — 99214 OFFICE O/P EST MOD 30 MIN: CPT | Performed by: PSYCHIATRY & NEUROLOGY

## 2022-09-14 NOTE — PROGRESS NOTES
NEUROLOGY CLINIC NOTE    Patient ID:  Nadia Kilgore  [id number]  39 y.o.  1985    Date of Consultation:  September 14, 2022    Referring Physician: Errol Reeves NP    Reason for Consultation:  question of cervical dystonia    Chief Complaint   Patient presents with    Follow-up     Consult for cervical dystonia        History of Present Illness:     Patient Active Problem List    Diagnosis Date Noted    Prolactinoma ([facility] [address] 08/30/2022    Hyponatremia 08/06/2022    Sinus bradycardia by electrocardiogram 07/29/2022    Pain in joint, multiple sites 04/11/2014    Cholesteatoma middle ear 09/20/2010     Past Medical History:   Diagnosis Date    Anxiety     Bipolar affect, depressed     Eating disorder, anorexia nervosa 2006    Has been hospitalized for this in the past    Fibromyalgia     IBS (irritable bowel syndrome)     Kidney stones     Migraine     Opiate addiction ([facility] [address] 2015    Per [facility] - Went to pain clinic    PTSD (post-traumatic stress disorder) 2015    Dx at [facility] 2015      Past Surgical History:   Procedure Laterality Date    HX LEEP PROCEDURE  2015    precancerous cells    HX TONSIL AND ADENOIDECTOMY      HX WISDOM TEETH EXTRACTION        Prior to Admission medications    Medication Sig Start Date End Date Taking? Authorizing Provider   ondansetron hcl (ZOFRAN) 4 mg tablet Take 4 mg by mouth every eight (8) hours as needed. 8/7/22  Yes Provider, Historical   cabergoline (DOSTINEX) 0.5 mg tablet Take 0.5 mg by mouth two (2) times a week. 8/11/22 3/16/23 Yes Provider, Historical   ashwagandha root extract 300 mg cap Take 1 Capsule by mouth two (2) times a day. Yes Provider, Historical   ibuprofen (MOTRIN) 400 mg tablet Take 400 mg by mouth every six (6) hours as needed for Pain. Yes Provider, Historical   cyclobenzaprine (FLEXERIL) 10 mg tablet Take 1 Tablet by mouth nightly.   Patient not taking: Reported on 9/14/2022 8/30/22   Sophy Bundy MD     Allergies   Allergen Reactions    Keflex [Cephalexin] Other (comments)     Mini seizure. Social History     Tobacco Use    Smoking status: Former     Types: Cigarettes     Quit date: 2/15/2006     Years since quittin.5    Smokeless tobacco: Never   Substance Use Topics    Alcohol use: No     Alcohol/week: 0.0 standard drinks      Family History   Problem Relation Age of Onset    Psychiatric Disorder Mother     Elevated Lipids Mother     Psychiatric Disorder Sister     SLE Paternal Grandmother         Subjective:      Saul Noonan is a 39 y.o. WF who was referred here by Shen Sandhu NP who was referred here for further evaluation of her chronic neck pain, shoulder pain and headache. Patient complaining of:  Mid shoulder pain  Neck, head and jaw pain  No physical therapy   Not taking Cyclobenzaprine regularly - makes her feel tired    Cervical x-ray 2022 revealed no acute fracture or dislocation. 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 - Bilateral cavernous sinus invasion, left worse than right. - Extension into the left posterior orbit through the left superior orbital fissure with marked crowding at the orbital apex. - Small components extending into the bilateral sphenoid sinuses through the sellar floor. Outside reports reviewed: office notes, radiology reports. Review of Systems:    A comprehensive review of systems was negative except for:     Objective:     Visit Vitals  /70 (BP 1 Location: Left upper arm, BP Patient Position: Sitting, BP Cuff Size: Adult)   Pulse 74   Resp 20   LMP  (LMP Unknown) Comment: has not had in 7 years   SpO2 98%       PHYSICAL EXAM:    NEUROLOGICAL EXAM:    Appearance: The patient is well developed, well nourished, provides a coherent history and is in no acute distress.    Mental Status: Oriented to time, place and person. Fluent, no aphasia or dysarthria. Mood and affect appropriate. Cranial Nerves:   II - XII were intact. Motor:  5/5 strength in upper and lower proximal and distal muscles. Normal bulk and tone. No pronator drift. Reflexes:   Deep tendon reflexes were symmetrical. Downgoing toes. Sensory:   Normal to cold, pinprick and vibration. Gait:  Normal gait. No Romberg. Tremor:   No tremor noted. Cerebellar:  Intact FTN/GAGAN/HTS. Cervical range of motion measurement:       Degrees   Head flexion   65   Head extension  80   Right lateral head flexion 47   Left lateral head flexion 65   Right head rotation  90+   Left head rotation  90+    Anterior head posture (2-3 FB off shoulder) with shoulders rotated in  (+) tenderness on palpation bilateral occiput, neck and shoulder muscles    Imaging:  Brain MRI with and without: reviewed       Assessment:   Cervicalgia  Generalized hypermobility of joints    Plan:   Neurological examination does not reveal any findings typically seen in cervical dystonia. Patient does have poor anterior head posture with shoulder rotated in. Range of motion measurements does not reveal any restrictions. More of hypermobility. Patient is not a candidate for chemodenervation with Botox. Recommend PM&R consultation (Dr. Mohit Varela) for structured treatment and rehab. Patient has generalized hypermobility of joints. Recommend PM&R consultation or genetisist evaluation. All questions and concerns were answered.

## 2022-09-14 NOTE — LETTER
9/14/2022    Patient: Francis Lewis   YOB: 1985   Date of Visit: 9/14/2022     Gena Andersen [address] [place] [address] Mary    Dear Gena Andersen MD,      Thank you for referring Ms. Francis Lewis to [facility] for evaluation. My notes for this consultation are attached. If you have questions, please do not hesitate to call me. I look forward to following your patient along with you.       Sincerely,    Karol Nixon MD

## 2022-09-15 ENCOUNTER — HOSPITAL ENCOUNTER (OUTPATIENT)
Dept: PHYSICAL THERAPY | Age: 37
Discharge: HOME | End: 2022-09-15
Payer: MEDICAID

## 2022-09-15 PROCEDURE — 97140 MANUAL THERAPY 1/> REGIONS: CPT | Performed by: PHYSICAL THERAPIST

## 2022-09-15 PROCEDURE — 97162 PT EVAL MOD COMPLEX 30 MIN: CPT | Performed by: PHYSICAL THERAPIST

## 2022-09-15 PROCEDURE — 97112 NEUROMUSCULAR REEDUCATION: CPT | Performed by: PHYSICAL THERAPIST

## 2022-09-15 NOTE — THERAPY EVALUATION
Physical Therapy at [facility],   a part of  [facility]  [facility] [address] [facility], [address]  Phone: [phone]  Fax: [phone]    Plan of Care/ Statement of Necessity for Physical Therapy Services 2-15    Patient name: Jake Puri  : 1985  Provider#: [id number]  Referral source: Calvin Lewis MD      Medical/Treatment Diagnosis: Neck pain [M54.2]     Prior Hospitalization: see medical history     Comorbidities: Hypermobility, previous pituitary gland tumor  Prior Level of Function: see initial eval  Medications: Verified on Patient Summary List    Start of Care: 9/15/2022      Onset Date: Gradual onset over the past several years. The Plan of Care and following information is based on the information from the initial evaluation. Assessment/ key information: The patient presents with chronic neck pain and cervicogenic headaches with hypermobility and postural dysfunction being possible contributing factors to the chronicity of her symptoms. Evaluation Complexity History MEDIUM  Complexity : 1-2 comorbidities / personal factors will impact the outcome/ POC ; Examination MEDIUM Complexity : 3 Standardized tests and measures addressing body structure, function, activity limitation and / or participation in recreation  ;Presentation MEDIUM Complexity : Evolving with changing characteristics  ; Clinical Decision Making MEDIUM Complexity : FOTO score of 26-74  Overall Complexity Rating: MEDIUM    Problem List: pain affecting function, decrease ROM, decrease strength, decrease ADL/ functional abilitiies, decrease activity tolerance, decrease flexibility/ joint mobility, and decrease transfer abilities   Treatment Plan may include any combination of the following: Therapeutic exercise, Therapeutic activities, Neuromuscular re-education, Physical agent/modality, Gait/balance training, Manual therapy, Patient education, Self Care training, Functional mobility training, Home safety training, and Stair training  Patient / Family readiness to learn indicated by: asking questions, trying to perform skills, and interest  Persons(s) to be included in education: patient (P)  Barriers to Learning/Limitations: None  Patient Goal (s): I want to be able to do things around the house with less pain.   Patient Self Reported Health Status: excellent  Rehabilitation Potential: excellent    Short Term Goals: To be accomplished in 4 weeks:  Patient will be able to demonstrate >70 degrees of cervical PROM rotation in supine to demonstrate adequate anterior neck inhibition with ADL's. Patient will be able to demonstrate >70 degrees of B shoulder IR PROM to allow for improved humeroglenoid stability with overhead reaching. Patient will be able to demonstrate >30 degrees of B shoulder horizontal abduction to allow for improved scapulothoracic stability when carrying objects. Long Term Goals: To be accomplished in 12 weeks:  Patient will be able to lift 20# from the floor without pain or limitation. Patient will be able to drive for 30 minutes without pain or limitation. Patient will be able to lift 5# overhead without pain or limitation. Frequency / Duration: Patient to be seen 2 times per week for 12 weeks. Patient/ Caregiver education and instruction: self care, activity modification, and exercises    [x]  Plan of care has been reviewed with JASON Rasmussen, PT 9/15/2022     ________________________________________________________________________    I certify that the above Therapy Services are being furnished while the patient is under my care. I agree with the treatment plan and certify that this therapy is necessary.     [de-identified] Signature:____________________  Date:____________Time: _________      Heriberto Najjar, MD

## 2022-09-15 NOTE — PROGRESS NOTES
PT INITIAL EVALUATION NOTE 2-15    Patient Name: Lynn Moffett  Date:9/15/2022  : 1985  [x]  Patient  Verified  Payor: [address] / Plan: [address] [facility] / Product Type: Managed Care Medicaid /    In time:2:30 PM  Out time:3:30 PM  Total Treatment Time (min): 60  Visit #: 1     Treatment Area: Neck pain [M54.2]    SUBJECTIVE  Pain Level (0-10 scale): 3/10  Any medication changes, allergies to medications, adverse drug reactions, diagnosis change, or new procedure performed?: [] No    [x] Yes (see summary sheet for update)  Subjective:     Neck pain  PLOF: No limitations with turning her head, no involuntary muscle contractions at the neck, improved tolerance to lifting objects within the home, improved tolerance to home management tasks such as laundry, vacuuming  Mechanism of Injury: Gradual worsening over the past several years. Patient reports tension along B rhomboids, UT's that radiates up her neck and into her head. She also has had decreased strength and endurance with ADL's. Previous Treatment/Compliance: She had a consult with both her PCP and neurologist recently and the decision was to start PT and have a  work her up for EDS. She has not had PT before. She reports being very active several years and found exercises very helpful to management her symptoms. She has found that increased stress in her life has made her neck pain worse and therefore made the previous exercise routine too uncomfortable. 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 She neurology notes in [address] [place] [address] for more details.   Work Hx: SAHM  Living Situation: lives in a two story home with   Pt Goals: to reduce pain and improve ability to perform home management tasks  Barriers: chronicity and severity of symptoms  Motivation: very motivated  Substance use: none   Cognition: A & O x 4        OBJECTIVE/EXAMINATION  Myokinematic Restoration           R   L  Add Drop Test     +   +  Extension Drop Test    -   -  Trunk Rotation     WNL   WNL  Straight Leg Raise    >100   >100  FA IR ROM  FA ER ROM  FA ER Strength  FA IR Strength  HAddLT  Standing Reach Test    Palms on floor    Pelvis Restoration     Pelvic [place] Drop Test  Passive Abduction Raise Test  Posterior Mediastinum Expansion  Limited   Limited    Postural Respiration    Apical Expansion    Very limited  Limited  Horizontal Abduction    20   0  Shoulder Flexion    180   180  HG IR      45   60  Lower Rib Flare    +   ++      Modality rationale: decrease pain and increase tissue extensibility to improve the patients ability to perform home management tasks without pain   Min Type Additional Details    [] Estim: []Att   []Unatt        []TENS instruct                  []IFC  []Premod   []NMES                     []Other:  []w/US   []w/ice   []w/heat  Position:  Location:    []  Traction: [] Cervical       []Lumbar                       [] Prone          []Supine                       []Intermittent   []Continuous Lbs:  [] before manual  [] after manual  []w/heat    []  Ultrasound: []Continuous   [] Pulsed at:                            []1MHz   []3MHz Location:  W/cm2:    []  Paraffin         Location:  []w/heat   10 []  Ice     [x]  Heat  []  Ice massage Position: supine  Location: neck, back    []  Laser  []  Other: Position:  Location:    []  Vasopneumatic Device Pressure:       [] lo [] med [] hi   Temperature:    [x] Skin assessment post-treatment:  [x]intact []redness- no adverse reaction    []redness - adverse reaction:     10 min Neuromuscular Re-education:  [x]  See flow sheet :   Rationale: increase ROM, increase strength, and improve coordination  to improve the patients ability to perform home management tasks without pain    15 min Manual Therapy:    Manual cervical traction  Infraclavicular pumping  B kamron's   Rationale: decrease pain, increase ROM, increase tissue extensibility, and increase postural awareness  to improve the patients ability to perform home management tasks without pain            With   [] TE   [] TA   [] Neuro   [] SC   [] other: Patient Education: [x] Review HEP    [] Progressed/Changed HEP based on:   [] positioning   [] body mechanics   [] transfers   [] heat/ice application    [] other:        Other Objective/Functional Measures:    Pain Level (0-10 scale) post treatment: 2/10, \"much better\"      ASSESSMENT:      [x]  See Plan of Lisandro Lou, PT 9/15/2022

## 2022-09-19 ENCOUNTER — HOSPITAL ENCOUNTER (OUTPATIENT)
Dept: PHYSICAL THERAPY | Age: 37
Discharge: HOME | End: 2022-09-19
Payer: MEDICAID

## 2022-09-19 PROCEDURE — 97140 MANUAL THERAPY 1/> REGIONS: CPT | Performed by: PHYSICAL THERAPIST

## 2022-09-19 PROCEDURE — 97112 NEUROMUSCULAR REEDUCATION: CPT | Performed by: PHYSICAL THERAPIST

## 2022-09-19 NOTE — PROGRESS NOTES
PT DAILY TREATMENT NOTE 2-15    Patient Name: Jake Puri  Date:2022  : 1985  [x]  Patient  Verified  Payor: [address] / Plan: [address] [facility] / Product Type: Managed Care Medicaid /    In time: 2:55 PM  Out time: 3:45 PM  Total Treatment Time (min): 50  Visit #:  2    Treatment Area: Neck pain [M54.2]    SUBJECTIVE  Pain Level (0-10 scale): 3/10  Any medication changes, allergies to medications, adverse drug reactions, diagnosis change, or new procedure performed?: [x] No    [] Yes (see summary sheet for update)  Subjective functional status/changes:   [] No changes reported  Patient reports a similar level of symptoms as last week. She has been able to perform her HEP daily.     OBJECTIVE            Modality rationale: decrease pain and increase tissue extensibility to improve the patients ability to perform home management tasks without pain    Min Type Additional Details     [] Estim: []Att   []Unatt        []TENS instruct                  []IFC  []Premod   []NMES                     []Other:  []w/US   []w/ice   []w/heat  Position:  Location:     []  Traction: [] Cervical       []Lumbar                       [] Prone          []Supine                       []Intermittent   []Continuous Lbs:  [] before manual  [] after manual  []w/heat     []  Ultrasound: []Continuous   [] Pulsed at:                            []1MHz   []3MHz Location:  W/cm2:     []  Paraffin         Location:  []w/heat   10 []  Ice     [x]  Heat  []  Ice massage Position: supine  Location: neck, back     []  Laser  []  Other: Position:  Location:     []  Vasopneumatic Device Pressure:       [] lo [] med [] hi   Temperature:    [x] Skin assessment post-treatment:  [x]intact []redness- no adverse reaction    []redness - adverse reaction:      15 min Neuromuscular Re-education:  [x]  See flow sheet :   Rationale: increase ROM, increase strength, and improve coordination  to improve the patients ability to perform home management tasks without pain     25 min Manual Therapy:    Manual cervical traction  Infraclavicular pumping  B sibson's   Rationale: decrease pain, increase ROM, increase tissue extensibility, and increase postural awareness  to improve the patients ability to perform home management tasks without pain    With   [] TE   [] TA   [] Neuro   [] SC   [] other: Patient Education: [x] Review HEP    [] Progressed/Changed HEP based on:   [] positioning   [] body mechanics   [] transfers   [] heat/ice application    [] other:      Other Objective/Functional Measures:   Improved dystonia symptoms in supine, returns in sitting and standing positions     Pain Level (0-10 scale) post treatment: 1/10,\"better\"    ASSESSMENT/Changes in Function:   Patient will continue to benefit from skilled PT services to modify and progress therapeutic interventions, address functional mobility deficits, address ROM deficits, address strength deficits, analyze and address soft tissue restrictions, analyze and cue movement patterns, analyze and modify body mechanics/ergonomics, assess and modify postural abnormalities, and address imbalance/dizziness to attain remaining goals. []  See Plan of Care  []  See progress note/recertification  []  See Discharge Summary         Progress towards goals / Updated goals:  No significant progress towards functional goals made on today's visit.     PLAN  [x]  Upgrade activities as tolerated     [x]  Continue plan of care  []  Update interventions per flow sheet       []  Discharge due to:_  []  Other:_      General Whyte PT 9/19/2022

## 2022-09-21 ENCOUNTER — VIRTUAL VISIT (OUTPATIENT)
Dept: FAMILY MEDICINE CLINIC | Age: 37
End: 2022-09-21
Payer: MEDICAID

## 2022-09-21 ENCOUNTER — HOSPITAL ENCOUNTER (OUTPATIENT)
Dept: CT IMAGING | Age: 37
Discharge: HOME | End: 2022-09-21
Attending: NURSE PRACTITIONER
Payer: MEDICAID

## 2022-09-21 DIAGNOSIS — H51.8 DYSCONJUGATE GAZE: ICD-10-CM

## 2022-09-21 DIAGNOSIS — H02.402 PTOSIS OF LEFT EYELID: ICD-10-CM

## 2022-09-21 DIAGNOSIS — R51.9 FACE PAIN: ICD-10-CM

## 2022-09-21 DIAGNOSIS — G44.229 CHRONIC TENSION-TYPE HEADACHE, NOT INTRACTABLE: ICD-10-CM

## 2022-09-21 DIAGNOSIS — M62.838 NECK MUSCLE SPASM: Primary | ICD-10-CM

## 2022-09-21 PROCEDURE — 70486 CT MAXILLOFACIAL W/O DYE: CPT

## 2022-09-21 PROCEDURE — 99442 PR PHYS/QHP TELEPHONE EVALUATION 11-20 MIN: CPT | Performed by: FAMILY MEDICINE

## 2022-09-21 RX ORDER — BUTALBITAL, ACETAMINOPHEN AND CAFFEINE 50; 325; 40 MG/1; MG/1; MG/1
1 TABLET ORAL
Qty: 50 TABLET | Refills: 0 | Status: SHIPPED | OUTPATIENT
Start: 2022-09-21 | End: 2022-10-16

## 2022-09-21 NOTE — PROGRESS NOTES
Shannon Good (: 1985) is a 39 y.o. female, established patient, here for evaluation of the following chief complaint(s):   Medication Refill       ASSESSMENT/PLAN:  Below is the assessment and plan developed based on review of pertinent history, labs, studies, and medications. 1. Neck muscle spasm  -     butalbital-acetaminophen-caffeine (FIORICET, ESGIC) -40 mg per tablet; Take 1 Tablet by mouth two (2) times daily as needed for Headache for up to 25 days. , Normal, Disp-50 Tablet, R-0  2. Chronic tension-type headache, not intractable  -     butalbital-acetaminophen-caffeine (FIORICET, ESGIC) -40 mg per tablet; Take 1 Tablet by mouth two (2) times daily as needed for Headache for up to 25 days. , Normal, Disp-50 Tablet, R-0      No follow-ups on file. SUBJECTIVE/OBJECTIVE:  Patient states that she has some head and neck issues going on. She needs to use fiorcet while she is being worked up. Discussed adverse interactions between ashwaganda, advil and fiorcet. Review of Systems   Constitutional: Negative. HENT: Negative. Eyes: Negative. Respiratory: Negative. Cardiovascular: Negative. Gastrointestinal: Negative. Endocrine: Negative. Genitourinary: Negative. Musculoskeletal: Negative. Skin: Negative. Allergic/Immunologic: Negative. Neurological: Negative. Hematological: Negative. Psychiatric/Behavioral: Negative.         No data recorded     Physical Exam    [INSTRUCTIONS:  \"[x]\" Indicates a positive item  \"[]\" Indicates a negative item  -- DELETE ALL ITEMS NOT EXAMINED]    Constitutional: [x] Appears well-developed and well-nourished [x] No apparent distress      [] Abnormal -     Mental status: [x] Alert and awake  [x] Oriented to person/place/time [x] Able to follow commands    [] Abnormal -     Eyes:   EOM    [x]  Normal    [] Abnormal -   Sclera  [x]  Normal    [] Abnormal -          Discharge [x]  None visible   [] Abnormal -     HENT: [x] Normocephalic, atraumatic  [] Abnormal -   [x] Mouth/Throat: Mucous membranes are moist    External Ears [x] Normal  [] Abnormal -    Neck: [x] No visualized mass [] Abnormal -     Pulmonary/Chest: [x] Respiratory effort normal   [x] No visualized signs of difficulty breathing or respiratory distress        [] Abnormal -      Musculoskeletal:   [x] Normal gait with no signs of ataxia         [x] Normal range of motion of neck        [] Abnormal -     Neurological:        [x] No Facial Asymmetry (Cranial nerve 7 motor function) (limited exam due to video visit)          [x] No gaze palsy        [] Abnormal -          Skin:        [x] No significant exanthematous lesions or discoloration noted on facial skin         [] Abnormal -            Psychiatric:       [x] Normal Affect [] Abnormal -        [x] No Hallucinations    Other pertinent observable physical exam findings:-    On this date 09/21/2022 I have spent 20 minutes reviewing previous notes, test results and face to face (virtual) with the patient discussing the diagnosis and importance of compliance with the treatment plan as well as documenting on the day of the visitSally De La Cruz, was evaluated through a synchronous (real-time) audio-video encounter. The patient (or guardian if applicable) is aware that this is a billable service, which includes applicable co-pays. This Virtual Visit was conducted with patient's (and/or legal guardian's) consent. The visit was conducted pursuant to the emergency declaration under the [address] [place] [address] and the [employer] General Act. Patient identification was verified, and a caregiver was present when appropriate. The patient was located at: Home: [address] [place] [address] [address] [address]  The provider was located at:  Facility (Appt Department): [phone]       An electronic signature was used to authenticate this note.   -- Luigi Leroy MD

## 2022-09-22 ENCOUNTER — HOSPITAL ENCOUNTER (OUTPATIENT)
Dept: PHYSICAL THERAPY | Age: 37
Discharge: HOME | End: 2022-09-22
Payer: MEDICAID

## 2022-09-22 PROCEDURE — 97112 NEUROMUSCULAR REEDUCATION: CPT | Performed by: PHYSICAL THERAPIST

## 2022-09-22 PROCEDURE — 97140 MANUAL THERAPY 1/> REGIONS: CPT | Performed by: PHYSICAL THERAPIST

## 2022-09-22 NOTE — PROGRESS NOTES
PT DAILY TREATMENT NOTE 2-15    Patient Name: Kenya Bush  Date:2022  : 1985  [x]  Patient  Verified  Payor: [address] / Plan: [address] [facility] / Product Type: Managed Care Medicaid /    In time: 1:35 PM  Out time:2:40  Total Treatment Time (min): 65  Visit #:  3    Treatment Area: Neck pain [M54.2]    SUBJECTIVE  Pain Level (0-10 scale): 3/10  Any medication changes, allergies to medications, adverse drug reactions, diagnosis change, or new procedure performed?: [x] No    [] Yes (see summary sheet for update)  Subjective functional status/changes:   [] No changes reported  Patient reports that the HEP has become easier to perform and he feels like it is managing her neck pain well.     OBJECTIVE            Modality rationale: decrease pain and increase tissue extensibility to improve the patients ability to perform home management tasks without pain    Min Type Additional Details     [] Estim: []Att   []Unatt        []TENS instruct                  []IFC  []Premod   []NMES                     []Other:  []w/US   []w/ice   []w/heat  Position:  Location:     []  Traction: [] Cervical       []Lumbar                       [] Prone          []Supine                       []Intermittent   []Continuous Lbs:  [] before manual  [] after manual  []w/heat     []  Ultrasound: []Continuous   [] Pulsed at:                            []1MHz   []3MHz Location:  W/cm2:     []  Paraffin         Location:  []w/heat   10 []  Ice     [x]  Heat  []  Ice massage Position: supine  Location: neck, back     []  Laser  []  Other: Position:  Location:     []  Vasopneumatic Device Pressure:       [] lo [] med [] hi   Temperature:    [x] Skin assessment post-treatment:  [x]intact []redness- no adverse reaction    []redness - adverse reaction:      30 min Neuromuscular Re-education:  [x]  See flow sheet :   Rationale: increase ROM, increase strength, and improve coordination  to improve the patients ability to perform home management tasks without pain     25 min Manual Therapy:    Manual cervical traction  Infraclavicular pumping  B sibson's  L rib depression with R arm reach   Rationale: decrease pain, increase ROM, increase tissue extensibility, and increase postural awareness  to improve the patients ability to perform home management tasks without pain    With   [] TE   [] TA   [] Neuro   [] SC   [] other: Patient Education: [x] Review HEP    [] Progressed/Changed HEP based on:   [] positioning   [] body mechanics   [] transfers   [] heat/ice application    [] other:      Other Objective/Functional Measures:     Pain Level (0-10 scale) post treatment: 1/10,\"better\"    ASSESSMENT/Changes in Function:   Patient will continue to benefit from skilled PT services to modify and progress therapeutic interventions, address functional mobility deficits, address ROM deficits, address strength deficits, analyze and address soft tissue restrictions, analyze and cue movement patterns, analyze and modify body mechanics/ergonomics, assess and modify postural abnormalities, and address imbalance/dizziness to attain remaining goals. []  See Plan of Care  []  See progress note/recertification  []  See Discharge Summary         Progress towards goals / Updated goals:  Patient is progressing well with [facility] program and will focus on repositioning R BC pattern next visit.     PLAN  [x]  Upgrade activities as tolerated     [x]  Continue plan of care  []  Update interventions per flow sheet       []  Discharge due to:_  []  Other:_      Uma Perez, PT 9/22/2022

## 2022-09-23 ENCOUNTER — NURSE TRIAGE (OUTPATIENT)
Dept: OTHER | Facility: CLINIC | Age: 37
End: 2022-09-23

## 2022-09-23 DIAGNOSIS — J38.7 SUPRAGLOTTIC MASS: ICD-10-CM

## 2022-09-23 DIAGNOSIS — R22.1 NECK MASS: ICD-10-CM

## 2022-09-23 DIAGNOSIS — R93.89 ABNORMAL CT SCAN: Primary | ICD-10-CM

## 2022-09-23 NOTE — TELEPHONE ENCOUNTER
Received call from [facility] at [facility] with Red Flag Complaint. Subjective: Caller states \"I am having pain in the right side of neck. \"     Current Symptoms: Pain in right front side neck. Feels tight. And slightly swollen. Hurts to talk  and to eat. Pain seems to be spreading. Tingling in bilateral lower legs    Hoping for some hydrocodone to hold her until Monday when she sees the ENT. Onset: years worsening the last month. Associated Symptoms: reduced activity, reduced appetite, reduced fluid intake, increased wakefulness    Pain Severity: 6/10; aching, stabbing, and throbbing; constant    Temperature: none     What has been tried: tylenol, heat ice, diet    LMP: NA Pregnant: No    Recommended disposition: See in Office Today    Care advice provided, patient verbalizes understanding; denies any other questions or concerns; instructed to call back for any new or worsening symptoms. Patient/Caller agrees with recommended disposition; writer provided warm transfer to [place] at [facility] for appointment scheduling    Attention Provider: Thank you for allowing me to participate in the care of your patient. The patient was connected to triage in response to information provided to the [facility]. Please do not respond through this encounter as the response is not directed to a shared pool.         Reason for Disposition   Patient wants to be seen    Protocols used: Neck Pain or Stiffness-ADULT-[place]

## 2022-09-26 ENCOUNTER — APPOINTMENT (OUTPATIENT)
Dept: PHYSICAL THERAPY | Age: 37
End: 2022-09-26
Payer: MEDICAID

## 2022-09-27 ENCOUNTER — TRANSCRIBE ORDER (OUTPATIENT)
Dept: SCHEDULING | Age: 37
End: 2022-09-27

## 2022-09-27 DIAGNOSIS — D10.1 BENIGN NEOPLASM OF BASE OF TONGUE: Primary | ICD-10-CM

## 2022-09-27 DIAGNOSIS — R07.0 THROAT PAIN: ICD-10-CM

## 2022-09-27 RX ORDER — RIMEGEPANT SULFATE 75 MG/75MG
TABLET, ORALLY DISINTEGRATING ORAL
Qty: 8 TABLET | Refills: 5 | Status: SHIPPED | OUTPATIENT
Start: 2022-09-27

## 2022-09-29 ENCOUNTER — APPOINTMENT (OUTPATIENT)
Dept: PHYSICAL THERAPY | Age: 37
End: 2022-09-29
Payer: MEDICAID

## 2022-10-03 ENCOUNTER — OFFICE VISIT (OUTPATIENT)
Dept: FAMILY MEDICINE CLINIC | Age: 37
End: 2022-10-03
Payer: MEDICAID

## 2022-10-03 ENCOUNTER — HOSPITAL ENCOUNTER (OUTPATIENT)
Dept: PHYSICAL THERAPY | Age: 37
Discharge: HOME | End: 2022-10-03
Payer: MEDICAID

## 2022-10-03 VITALS
HEART RATE: 66 BPM | BODY MASS INDEX: 20.25 KG/M2 | DIASTOLIC BLOOD PRESSURE: 72 MMHG | TEMPERATURE: 97.4 F | RESPIRATION RATE: 18 BRPM | WEIGHT: 126 LBS | HEIGHT: 66 IN | SYSTOLIC BLOOD PRESSURE: 126 MMHG

## 2022-10-03 DIAGNOSIS — M62.838 NECK MUSCLE SPASM: Primary | ICD-10-CM

## 2022-10-03 DIAGNOSIS — G44.229 CHRONIC TENSION-TYPE HEADACHE, NOT INTRACTABLE: ICD-10-CM

## 2022-10-03 PROCEDURE — 97112 NEUROMUSCULAR REEDUCATION: CPT | Performed by: PHYSICAL MEDICINE & REHABILITATION

## 2022-10-03 PROCEDURE — 99213 OFFICE O/P EST LOW 20 MIN: CPT | Performed by: INTERNAL MEDICINE

## 2022-10-03 RX ORDER — ST. JOHN'S WORT 300 MG
2 CAPSULE ORAL DAILY
COMMUNITY

## 2022-10-03 NOTE — PROGRESS NOTES
Chief Complaint   Patient presents with    Pain (Chronic)     Head and neck from tumor      Assessment/ Plan:   Diagnoses and all orders for this visit:    1. Neck muscle spasm    2. Chronic tension-type headache, not intractable     Will continue to monitor. Just given Fioricet #50 tablets on 9/21/2022 by Dr. Favian Beltran. She has been followed by neurology who felt that her neck pain was probably due to posture and not related to dystonia. They have referred her for PM & R and she has set up the appointment. She also has a large anterior pituitary lesion that requires follow up with imaging and this has been ordered by neurology. The patient continues to have heightened concerns about pain management. She is tearful that her pain is disrupting her interaction with her kids. PT and the Fioricet are helpful. The patient seems to want higher pain medications - like narcotics, which I declined. We discussed being seen by pain management if her pain needs go beyond what has already been prescribed. The patient was ambivalent about this. Will continue to manage expectations and careful guidance around pain medications. I have discussed the diagnosis with the patient and the intended treatment plan as seen in the above orders. The patient has received an after-visit summary and questions were answered concerning future plans. Asked to return should symptoms worsen or not improve with treatment. Any pending labs and studies will be relayed to patient when they become available. Pt verbalizes understanding of plan of care and denies further questions or concerns at this time. Follow-up and Dispositions    Return if symptoms worsen or fail to improve. Subjective:   Armin Berkowitz is a 39 y.o. female who presents today for follow up of neck pain and headaches. The patient has a fairly complicated history.  She has been evaluated by neurology recently with both NP and Neurologist. She has been referred for PM and Rehab. She is currently in PT at this time as well. She has no evidence of dystonia. She is overwhelmed by the pituitary tumor and her pain. She has pain in multiple joints and sites. She is tearful as she discusses her concerns. We reviewed available studies and referrals and discussed in more detail in the plan. HISTORICAL  PMH, PSH, FHX, SOCHX, ALLERGIES and MES were reviewed and updated today. Review of Systems  Review of Systems   Musculoskeletal:  Positive for joint pain, myalgias and neck pain. Neurological:  Positive for headaches. All other systems reviewed and are negative. Objective:     Vitals:    10/03/22 1107   BP: 126/72   Pulse: 66   Resp: 18   Temp: 97.4 °F (36.3 °C)   TempSrc: Temporal   Weight: 126 lb (57.2 kg)   Height: 5' 6\" (1.676 m)   PF: 99 L/min       Physical Exam  Constitutional:       Appearance: Normal appearance. Cardiovascular:      Rate and Rhythm: Normal rate and regular rhythm. Pulses: Normal pulses. Heart sounds: Normal heart sounds. Pulmonary:      Effort: Pulmonary effort is normal.      Breath sounds: Normal breath sounds. Musculoskeletal:      Cervical back: Normal range of motion and neck supple. Psychiatric:         Attention and Perception: Attention normal.         Mood and Affect: Mood normal.         Speech: Speech normal.         Behavior: Behavior normal.         Thought Content:  Thought content normal.     Gilbert Reich MD  [facility]   10/03/22

## 2022-10-03 NOTE — PROGRESS NOTES
Identified pt with two pt identifiers(name and ). Chief Complaint   Patient presents with    Pain (Chronic)     Head and neck from tumor         Health Maintenance Due   Topic    COVID-19 Vaccine (1)    DTaP/Tdap/Td series (1 - Tdap)    Cervical cancer screen     Flu Vaccine (1)       Wt Readings from Last 3 Encounters:   10/03/22 126 lb (57.2 kg)   22 117 lb (53.1 kg)   22 116 lb (52.6 kg)     Temp Readings from Last 3 Encounters:   10/03/22 97.4 °F (36.3 °C) (Temporal)   22 97.5 °F (36.4 °C)   22 98.2 °F (36.8 °C) (Temporal)     BP Readings from Last 3 Encounters:   10/03/22 126/72   22 110/70   22 108/60     Pulse Readings from Last 3 Encounters:   10/03/22 66   22 74   22 75         Learning Assessment:  :     Learning Assessment 4/15/2016   PRIMARY LEARNER Patient   HIGHEST LEVEL OF EDUCATION - PRIMARY LEARNER  GRADUATED HIGH SCHOOL OR GED   BARRIERS PRIMARY LEARNER NONE   CO-LEARNER CAREGIVER No   PRIMARY LANGUAGE ENGLISH   LEARNER PREFERENCE PRIMARY DEMONSTRATION     READING     LISTENING   ANSWERED BY patient   RELATIONSHIP SELF       Depression Screening:  :     3 most recent PHQ Screens 2022   Little interest or pleasure in doing things Not at all   Feeling down, depressed, irritable, or hopeless Not at all   Total Score PHQ 2 0       Fall Risk Assessment:  :     No flowsheet data found. Abuse Screening:  :     Abuse Screening Questionnaire 2022   Do you ever feel afraid of your partner? N N   Are you in a relationship with someone who physically or mentally threatens you? N N   Is it safe for you to go home? Y Y       Coordination of Care Questionnaire:  :     1) Have you been to an emergency room, urgent care clinic since your last visit? no   Hospitalized since your last visit? no             2) Have you seen or consulted any other health care providers outside of [address] [place] [address] since your last visit?  yes  ENT 9/2022    3) Do you have an Advance Directive on file? no  Are you interested in receiving information about Advance Directives? no    4. For patients aged 39-70: Has the patient had a colonoscopy / FIT/ Cologuard? NA - based on age      If the patient is female:    11. For patients aged 41-77: Has the patient had a mammogram within the past 2 years? NA - based on age or sex      10. For patients aged 21-65: Has the patient had a pap smear?  No

## 2022-10-03 NOTE — PROGRESS NOTES
Chief Complaint   Patient presents with    Pain (Chronic)     Head and neck from tumor      Assessment/ Plan:   {There are no diagnoses linked to this encounter. (Refresh or delete this SmartLink)}     I have discussed the diagnosis with the patient and the intended treatment plan as seen in the above orders. The patient has received an after-visit summary and questions were answered concerning future plans. Asked to return should symptoms worsen or not improve with treatment. Any pending labs and studies will be relayed to patient when they become available. Pt verbalizes understanding of plan of care and denies further questions or concerns at this time. Subjective:   Jose Alejandro Olivas is a 39 y.o. female who presents     HISTORICAL  PMH, PSH, FHX, SOCHX, ALLERGIES and MES were reviewed and updated today.       Review of Systems  ROS         Objective:     Vitals:    10/03/22 1107   BP: 126/72   Pulse: 66   Resp: 18   Temp: 97.4 °F (36.3 °C)   TempSrc: Temporal   Weight: 126 lb (57.2 kg)   Height: 5' 6\" (1.676 m)   PF: 99 L/min       Physical Exam    Raj Chavez MD  [facility]

## 2022-10-03 NOTE — PROGRESS NOTES
PT DAILY TREATMENT NOTE 2-15    Patient Name: Samantha Roth  Date:10/3/2022  : 1985  [x]  Patient  Verified  Payor: [address] / Plan: [employer] / Product Type: Managed Care Medicaid /    In time: 1:15 PM  Out time:2:10  Total Treatment Time (min): 55  Visit #:  4    Treatment Area: Neck pain [M54.2]    SUBJECTIVE  Pain Level (0-10 scale): 3/10  Any medication changes, allergies to medications, adverse drug reactions, diagnosis change, or new procedure performed?: [x] No    [] Yes (see summary sheet for update)  Subjective functional status/changes:   [] No changes reported  Patient reports that the HEP gives her relief for a little. Overall she is happy with her progress and what she has learned here.     OBJECTIVE            Modality rationale: decrease pain and increase tissue extensibility to improve the patients ability to perform home management tasks without pain    Min Type Additional Details     [] Estim: []Att   []Unatt        []TENS instruct                  []IFC  []Premod   []NMES                     []Other:  []w/US   []w/ice   []w/heat  Position:  Location:     []  Traction: [] Cervical       []Lumbar                       [] Prone          []Supine                       []Intermittent   []Continuous Lbs:  [] before manual  [] after manual  []w/heat     []  Ultrasound: []Continuous   [] Pulsed at:                            []1MHz   []3MHz Location:  W/cm2:     []  Paraffin         Location:  []w/heat   10 []  Ice     [x]  Heat  []  Ice massage Position: supine  Location: neck, back     []  Laser  []  Other: Position:  Location:     []  Vasopneumatic Device Pressure:       [] lo [] med [] hi   Temperature:    [x] Skin assessment post-treatment:  [x]intact []redness- no adverse reaction    []redness - adverse reaction:      45 min Neuromuscular Re-education:  [x]  See flow sheet :   Rationale: increase ROM, increase strength, and improve coordination  to improve the patients ability to perform home management tasks without pain    With   [] TE   [] TA   [] Neuro   [] SC   [] other: Patient Education: [x] Review HEP    [] Progressed/Changed HEP based on:   [] positioning   [] body mechanics   [] transfers   [] heat/ice application    [] other:      Other Objective/Functional Measures: Mod to max difficulty with today's ARGELIA exercise reproduction  HAdDT: + B  PADT: + B  HGIR: + R  Horizontal Abd: Limited L  HAdLT: NT  Functional Squat: NT    Pain Level (0-10 scale) post treatment: 1/10    ASSESSMENT/Changes in Function:   Patient will continue to benefit from skilled PT services to modify and progress therapeutic interventions, address functional mobility deficits, address ROM deficits, address strength deficits, analyze and address soft tissue restrictions, analyze and cue movement patterns, analyze and modify body mechanics/ergonomics, assess and modify postural abnormalities, and address imbalance/dizziness to attain remaining goals. []  See Plan of Care  []  See progress note/recertification  []  See Discharge Summary         Progress towards goals / Updated goals:  Educated on proper seated posture. Unable to perform standing wall reach without significant compensation present.     PLAN  [x]  Upgrade activities as tolerated     [x]  Continue plan of care  []  Update interventions per flow sheet       []  Discharge due to:_  []  Other:_      Sarah Pulling PTA, OPTA, CPT  10/3/2022

## 2022-10-06 ENCOUNTER — HOSPITAL ENCOUNTER (OUTPATIENT)
Dept: PHYSICAL THERAPY | Age: 37
Discharge: HOME | End: 2022-10-06
Payer: MEDICAID

## 2022-10-06 PROCEDURE — 97140 MANUAL THERAPY 1/> REGIONS: CPT | Performed by: PHYSICAL THERAPIST

## 2022-10-06 PROCEDURE — 97112 NEUROMUSCULAR REEDUCATION: CPT | Performed by: PHYSICAL THERAPIST

## 2022-10-06 NOTE — PROGRESS NOTES
PT DAILY TREATMENT NOTE 2-15    Patient Name: Arleen Dunaway  Date:10/6/2022  : 1985  [x]  Patient  Verified  Payor: [address] / Plan: [address] [facility] / Product Type: Managed Care Medicaid /    In time: 12:00 PM Out time:12:55 PM  Total Treatment Time (min): 55  Visit #:  5    Treatment Area: Neck pain [M54.2]    SUBJECTIVE  Pain Level (0-10 scale): 3/10  Any medication changes, allergies to medications, adverse drug reactions, diagnosis change, or new procedure performed?: [x] No    [] Yes (see summary sheet for update)  Subjective functional status/changes:   [] No changes reported  Patient greater relief seen in the past several days than earlier in the week. She feels that her HEP is helping her relax more and she is seeing longer term results.     OBJECTIVE            Modality rationale: decrease pain and increase tissue extensibility to improve the patients ability to perform home management tasks without pain    Min Type Additional Details     [] Estim: []Att   []Unatt        []TENS instruct                  []IFC  []Premod   []NMES                     []Other:  []w/US   []w/ice   []w/heat  Position:  Location:     []  Traction: [] Cervical       []Lumbar                       [] Prone          []Supine                       []Intermittent   []Continuous Lbs:  [] before manual  [] after manual  []w/heat     []  Ultrasound: []Continuous   [] Pulsed at:                            []1MHz   []3MHz Location:  W/cm2:     []  Paraffin         Location:  []w/heat   10 []  Ice     [x]  Heat  []  Ice massage Position: supine  Location: neck, back     []  Laser  []  Other: Position:  Location:     []  Vasopneumatic Device Pressure:       [] lo [] med [] hi   Temperature:    [x] Skin assessment post-treatment:  [x]intact []redness- no adverse reaction    []redness - adverse reaction:         30 min Neuromuscular Re-education:  [x]  See flow sheet :   Rationale: increase ROM, increase strength, and improve coordination  to improve the patients ability to perform home management tasks without pain     15 min Manual Therapy:    Manual cervical traction  Infraclavicular pumping  B sibson's  L rib depression with R arm reach   Rationale: decrease pain, increase ROM, increase tissue extensibility, and increase postural awareness  to improve the patients ability to perform home management tasks without pain       With   [] TE   [] TA   [] Neuro   [] SC   [] other: Patient Education: [x] Review HEP    [] Progressed/Changed HEP based on:   [] positioning   [] body mechanics   [] transfers   [] heat/ice application    [] other:      Other Objective/Functional Measures:     Greater ability to relax anterior neck with headrest at 15 degrees of extension    Pain Level (0-10 scale) post treatment: 1/10    ASSESSMENT/Changes in Function:   Patient will continue to benefit from skilled PT services to modify and progress therapeutic interventions, address functional mobility deficits, address ROM deficits, address strength deficits, analyze and address soft tissue restrictions, analyze and cue movement patterns, analyze and modify body mechanics/ergonomics, assess and modify postural abnormalities, and address imbalance/dizziness to attain remaining goals. []  See Plan of Care  []  See progress note/recertification  []  See Discharge Summary         Progress towards goals / Updated goals:  Greater progress seen this past week towards functional goals. PLAN  [x]  Upgrade activities as tolerated     [x]  Continue plan of care  []  Update interventions per flow sheet       []  Discharge due to:_  []  Other:_      Levorn Means, PT , DPT, OCS, Cert.  DN   10/6/2022

## 2022-10-10 ENCOUNTER — APPOINTMENT (OUTPATIENT)
Dept: PHYSICAL THERAPY | Age: 37
End: 2022-10-10
Payer: MEDICAID

## 2022-10-12 ENCOUNTER — HOSPITAL ENCOUNTER (OUTPATIENT)
Dept: CT IMAGING | Age: 37
Discharge: HOME | End: 2022-10-12
Attending: SPECIALIST
Payer: MEDICAID

## 2022-10-12 DIAGNOSIS — D10.1 BENIGN NEOPLASM OF BASE OF TONGUE: ICD-10-CM

## 2022-10-12 DIAGNOSIS — R07.0 THROAT PAIN: ICD-10-CM

## 2022-10-12 PROCEDURE — 70491 CT SOFT TISSUE NECK W/DYE: CPT

## 2022-10-12 PROCEDURE — 74011000636 HC RX REV CODE- 636: Performed by: SPECIALIST

## 2022-10-12 RX ADMIN — IOPAMIDOL 100 ML: 612 INJECTION, SOLUTION INTRAVENOUS at 14:00

## 2022-10-13 ENCOUNTER — HOSPITAL ENCOUNTER (OUTPATIENT)
Dept: PHYSICAL THERAPY | Age: 37
Discharge: HOME | End: 2022-10-13
Payer: MEDICAID

## 2022-10-13 PROCEDURE — 97112 NEUROMUSCULAR REEDUCATION: CPT | Performed by: PHYSICAL MEDICINE & REHABILITATION

## 2022-10-13 NOTE — PROGRESS NOTES
PT DAILY TREATMENT NOTE 2-15    Patient Name: Ronnell Stallworth  Date:10/13/2022  : 1985  [x]  Patient  Verified  Payor: [address] / Plan: [address] [facility] / Product Type: Managed Care Medicaid /    In time: 7206 AM Out time:1220 PM  Total Treatment Time (min): 50  Visit #:  6    Treatment Area: Neck pain [M54.2]    SUBJECTIVE  Pain Level (0-10 scale): 2/10  Any medication changes, allergies to medications, adverse drug reactions, diagnosis change, or new procedure performed?: [x] No    [] Yes (see summary sheet for update)  Subjective functional status/changes:   [] No changes reported  Patient reported continued improvement in overall improvements in neck pain. Pain still isn't resolved but getting better.     OBJECTIVE            Modality rationale: decrease pain and increase tissue extensibility to improve the patients ability to perform home management tasks without pain    Min Type Additional Details     [] Estim: []Att   []Unatt        []TENS instruct                  []IFC  []Premod   []NMES                     []Other:  []w/US   []w/ice   []w/heat  Position:  Location:     []  Traction: [] Cervical       []Lumbar                       [] Prone          []Supine                       []Intermittent   []Continuous Lbs:  [] before manual  [] after manual  []w/heat     []  Ultrasound: []Continuous   [] Pulsed at:                            []1MHz   []3MHz Location:  W/cm2:     []  Paraffin         Location:  []w/heat   10 []  Ice     [x]  Heat  []  Ice massage Position: supine  Location: neck, back     []  Laser  []  Other: Position:  Location:     []  Vasopneumatic Device Pressure:       [] lo [] med [] hi   Temperature:    [x] Skin assessment post-treatment:  [x]intact []redness- no adverse reaction    []redness - adverse reaction:         40 min Neuromuscular Re-education:  [x]  See flow sheet :   Rationale: increase ROM, increase strength, and improve coordination  to improve the patients ability to perform home management tasks without pain     With   [] TE   [] TA   [] Neuro   [] SC   [] other: Patient Education: [x] Review HEP    [] Progressed/Changed HEP based on:   [] positioning   [] body mechanics   [] transfers   [] heat/ice application    [] other:      Other Objective/Functional Measures:   Max difficulty with ZOA and L SL R glute max    HAdDT: + B  PADT: + B  HGIR: - B  Horizontal Abd: - B  HAdLT: NT  Functional Squat: NT      Pain Level (0-10 scale) post treatment: 1/10    ASSESSMENT/Changes in Function:   Patient will continue to benefit from skilled PT services to modify and progress therapeutic interventions, address functional mobility deficits, address ROM deficits, address strength deficits, analyze and address soft tissue restrictions, analyze and cue movement patterns, analyze and modify body mechanics/ergonomics, assess and modify postural abnormalities, and address imbalance/dizziness to attain remaining goals. []  See Plan of Care  []  See progress note/recertification  []  See Discharge Summary         Progress towards goals / Updated goals:  Greater progress seen this past week towards functional goals.      PLAN  [x]  Upgrade activities as tolerated     [x]  Continue plan of care  []  Update interventions per flow sheet       []  Discharge due to:_  []  Other:_      Virgen Shaper , PTA, OPTA, CPT  10/13/2022

## 2022-10-21 ENCOUNTER — HOSPITAL ENCOUNTER (OUTPATIENT)
Dept: PHYSICAL THERAPY | Age: 37
Discharge: HOME | End: 2022-10-21
Payer: MEDICAID

## 2022-10-21 PROCEDURE — 20560 NDL INSJ W/O NJX 1 OR 2 MUSC: CPT | Performed by: PHYSICAL THERAPIST

## 2022-10-21 PROCEDURE — 97112 NEUROMUSCULAR REEDUCATION: CPT | Performed by: PHYSICAL THERAPIST

## 2022-10-21 NOTE — PROGRESS NOTES
PT DAILY TREATMENT NOTE 2-15    Patient Name: Marline Jaeger  Date:10/21/2022  : 1985  [x]  Patient  Verified  Payor: [address] / Plan: [address] [facility] / Product Type: Managed Care Medicaid /    In time: 9:00 AM Out time:10:00 AM  Total Treatment Time (min): 60  Visit #:  7    Treatment Area: Neck pain [M54.2]    SUBJECTIVE  Pain Level (0-10 scale): 2/10  Any medication changes, allergies to medications, adverse drug reactions, diagnosis change, or new procedure performed?: [x] No    [] Yes (see summary sheet for update)  Subjective functional status/changes:   [] No changes reported  Patient reports that the exercises are helping her neck pain, but she continues to have tightness along the R cervical paraspinals. She is interested in DN.     OBJECTIVE            Modality rationale: decrease pain and increase tissue extensibility to improve the patients ability to perform home management tasks without pain    Min Type Additional Details     [] Estim: []Att   []Unatt        []TENS instruct                  []IFC  []Premod   []NMES                     []Other:  []w/US   []w/ice   []w/heat  Position:  Location:     []  Traction: [] Cervical       []Lumbar                       [] Prone          []Supine                       []Intermittent   []Continuous Lbs:  [] before manual  [] after manual  []w/heat     []  Ultrasound: []Continuous   [] Pulsed at:                            []1MHz   []3MHz Location:  W/cm2:     []  Paraffin         Location:  []w/heat   10 []  Ice     [x]  Heat  []  Ice massage Position: supine  Location: neck, back     []  Laser  []  Other: Position:  Location:     []  Vasopneumatic Device Pressure:       [] lo [] med [] hi   Temperature:    [x] Skin assessment post-treatment:  [x]intact []redness- no adverse reaction    []redness - adverse reaction:         25 min Neuromuscular Re-education:  [x]  See flow sheet :   Rationale: increase ROM, increase strength, and improve coordination  to improve the patients ability to perform home management tasks without pain     15   min Dry Needling without E-Stim         (not to be included in total timed codes or 1:1 Treatment Time)     min Dry Needling with E-Stim: Attended      Type:       Muscles:     min Dry Needling with E-Stim: Unattended      Type:       Muscles:        Billing:  [x]  02528 (1-2 muscles)         []  54159 (3+ muscles)           [x]  Script obtained from MD specifying \"Dry Needling\"    Written Informed Consent Obtained and Document Included in Chart: YES    Procedure: Intramsuclar Dry Needling was done with a .30 mm  gauge solid monofilament needle,                       under aseptic technique      Rationale/Necessity: Decrease pain, Increase ROM, Increase/Improve Function, Increase Tissue Extensibility, Reduce Trigger Points, Reduce Spasm, Restore Muscle Balance, Reduce Headaches, and Reduce TMJ/TMD    Muscles Treated:  [x]  Bilateral: Splenius cervicis, upper trapezius                                 []  Right:                                  []  Left:                            [x]  Hemostasis applied after each needle application     Response: Localized Twitch Response, Increased ROM, Increased Tolerance to Exercise/Activity, Increased Motor Recruitment, Improve Soft Tissue Mobility, Reduced Tightness, Reduced Headache, and Post Needle Soreness    Education: Purpose or rationale of dry needling                      Side effects and possible adverse effects of the treatment                      The need to report the use of blood thinners and/or immunosupressant medications                      How to respond to possible adverse effects of the treatment                       Self-treatment of post needling soreness (ice/heat, stretching, activity modification)                      Opportunity was given to ask questions; all questions were answered     With   [] TE   [] TA   [] Neuro   [] SC   [] other: Patient Education: [x] Review HEP    [] Progressed/Changed HEP based on:   [] positioning   [] body mechanics   [] transfers   [] heat/ice application    [] other:      Other Objective/Functional Measures:   No ARGELIA testing on today's date      Pain Level (0-10 scale) post treatment: 1/10    ASSESSMENT/Changes in Function:   Patient will continue to benefit from skilled PT services to modify and progress therapeutic interventions, address functional mobility deficits, address ROM deficits, address strength deficits, analyze and address soft tissue restrictions, analyze and cue movement patterns, analyze and modify body mechanics/ergonomics, assess and modify postural abnormalities, and address imbalance/dizziness to attain remaining goals. []  See Plan of Care  []  See progress note/recertification  []  See Discharge Summary         Progress towards goals / Updated goals:  Patient will f/u with DN and ARGELIA focused visits, likely alternating within the week. PLAN  [x]  Upgrade activities as tolerated     [x]  Continue plan of care  []  Update interventions per flow sheet       []  Discharge due to:_  []  Other:_      Colton Vásquez, PT , DPT, OCS, Cert.  DN    10/21/2022

## 2022-10-26 ENCOUNTER — APPOINTMENT (OUTPATIENT)
Dept: PHYSICAL THERAPY | Age: 37
End: 2022-10-26
Payer: MEDICAID

## 2022-10-28 ENCOUNTER — HOSPITAL ENCOUNTER (OUTPATIENT)
Dept: PHYSICAL THERAPY | Age: 37
Discharge: HOME | End: 2022-10-28
Payer: MEDICAID

## 2022-10-28 PROCEDURE — 20560 NDL INSJ W/O NJX 1 OR 2 MUSC: CPT | Performed by: PHYSICAL THERAPIST

## 2022-10-28 PROCEDURE — 97112 NEUROMUSCULAR REEDUCATION: CPT | Performed by: PHYSICAL THERAPIST

## 2022-10-28 NOTE — PROGRESS NOTES
PT DAILY TREATMENT NOTE 2-15    Patient Name: Marques Richard  Date:10/28/2022  : 1985  [x]  Patient  Verified  Payor: [address] / Plan: [facility] / Product Type: Managed Care Medicaid /    In time: 9:15 AM Out time:10:15 AM  Total Treatment Time (min): 60  Visit #:  8    Treatment Area: Neck pain [M54.2]    SUBJECTIVE  Pain Level (0-10 scale): 2/10  Any medication changes, allergies to medications, adverse drug reactions, diagnosis change, or new procedure performed?: [x] No    [] Yes (see summary sheet for update)  Subjective functional status/changes:   [] No changes reported  Patient reports that she was sore for a day after the DN, but is feeling better overall at her neck and happy with the progress.     OBJECTIVE            Modality rationale: decrease pain and increase tissue extensibility to improve the patients ability to perform home management tasks without pain    Min Type Additional Details     [] Estim: []Att   []Unatt        []TENS instruct                  []IFC  []Premod   []NMES                     []Other:  []w/US   []w/ice   []w/heat  Position:  Location:     []  Traction: [] Cervical       []Lumbar                       [] Prone          []Supine                       []Intermittent   []Continuous Lbs:  [] before manual  [] after manual  []w/heat     []  Ultrasound: []Continuous   [] Pulsed at:                            []1MHz   []3MHz Location:  W/cm2:     []  Paraffin         Location:  []w/heat   10 []  Ice     [x]  Heat  []  Ice massage Position: supine  Location: neck, back     []  Laser  []  Other: Position:  Location:     []  Vasopneumatic Device Pressure:       [] lo [] med [] hi   Temperature:    [x] Skin assessment post-treatment:  [x]intact []redness- no adverse reaction    []redness - adverse reaction:         25 min Neuromuscular Re-education:  [x]  See flow sheet :   Rationale: increase ROM, increase strength, and improve coordination  to improve the patients ability to perform home management tasks without pain     15   min Dry Needling without E-Stim         (not to be included in total timed codes or 1:1 Treatment Time)     min Dry Needling with E-Stim: Attended      Type:       Muscles:     min Dry Needling with E-Stim: Unattended      Type:       Muscles:        Billing:  [x]  94033 (1-2 muscles)         []  [id number] (3+ muscles)           [x]  Script obtained from MD specifying \"Dry Needling\"    Written Informed Consent Obtained and Document Included in Chart: YES    Procedure: Intramsuclar Dry Needling was done with a .30 mm  gauge solid monofilament needle,                       under aseptic technique      Rationale/Necessity: Decrease pain, Increase ROM, Increase/Improve Function, Increase Tissue Extensibility, Reduce Trigger Points, Reduce Spasm, Restore Muscle Balance, Reduce Headaches, and Reduce TMJ/TMD    Muscles Treated:  [x]  Bilateral: Splenius cervicis, upper trapezius                                 []  Right:                                  []  Left:                            [x]  Hemostasis applied after each needle application     Response: Localized Twitch Response, Increased ROM, Increased Tolerance to Exercise/Activity, Increased Motor Recruitment, Improve Soft Tissue Mobility, Reduced Tightness, Reduced Headache, and Post Needle Soreness    Education: Purpose or rationale of dry needling                      Side effects and possible adverse effects of the treatment                      The need to report the use of blood thinners and/or immunosupressant medications                      How to respond to possible adverse effects of the treatment                       Self-treatment of post needling soreness (ice/heat, stretching, activity modification)                      Opportunity was given to ask questions; all questions were answered     With   [] TE   [] TA   [] Neuro   [] SC   [] other: Patient Education: [x] Review HEP    [] Progressed/Changed HEP based on:   [] positioning   [] body mechanics   [] transfers   [] heat/ice application    [] other:      Other Objective/Functional Measures:       Pain Level (0-10 scale) post treatment: 1/10    ASSESSMENT/Changes in Function:   Patient will continue to benefit from skilled PT services to modify and progress therapeutic interventions, address functional mobility deficits, address ROM deficits, address strength deficits, analyze and address soft tissue restrictions, analyze and cue movement patterns, analyze and modify body mechanics/ergonomics, assess and modify postural abnormalities, and address imbalance/dizziness to attain remaining goals. []  See Plan of Care  []  See progress note/recertification  []  See Discharge Summary         Progress towards goals / Updated goals:  Good initial response with DN and will continue as needed to inhibit the cervical paraspinals and allow for greater progress through her [facility] facilitation program.    PLAN  [x]  Upgrade activities as tolerated     [x]  Continue plan of care  []  Update interventions per flow sheet       []  Discharge due to:_  []  Other:_      Lee Cartagena, PT , DPT, OCS, Cert.  DN    10/28/2022

## 2022-11-01 ENCOUNTER — HOSPITAL ENCOUNTER (OUTPATIENT)
Dept: PHYSICAL THERAPY | Age: 37
Discharge: HOME | End: 2022-11-01
Payer: MEDICAID

## 2022-11-01 PROCEDURE — 20560 NDL INSJ W/O NJX 1 OR 2 MUSC: CPT | Performed by: PHYSICAL THERAPIST

## 2022-11-01 PROCEDURE — 97112 NEUROMUSCULAR REEDUCATION: CPT | Performed by: PHYSICAL THERAPIST

## 2022-11-01 NOTE — PROGRESS NOTES
PT DAILY TREATMENT NOTE 2-15    Patient Name: Severiano Spalding  Date:2022  : 1985  [x]  Patient  Verified  Payor: [employer] / Plan: [address] [place] [address] / Product Type: Managed Care Medicaid /    In time: 4:20 PM Out time:5:20 PM  Total Treatment Time (min): 60  Visit #:  9    Treatment Area: Neck pain [M54.2]    SUBJECTIVE  Pain Level (0-10 scale): 210  Any medication changes, allergies to medications, adverse drug reactions, diagnosis change, or new procedure performed?: [x] No    [] Yes (see summary sheet for update)  Subjective functional status/changes:   [] No changes reported  Patient reports that she experienced roughly 1 day of full pain resolution at her neck following the last DN session and she is very happy with the progress.     OBJECTIVE            Modality rationale: decrease pain and increase tissue extensibility to improve the patients ability to perform home management tasks without pain    Min Type Additional Details     [] Estim: []Att   []Unatt        []TENS instruct                  []IFC  []Premod   []NMES                     []Other:  []w/US   []w/ice   []w/heat  Position:  Location:     []  Traction: [] Cervical       []Lumbar                       [] Prone          []Supine                       []Intermittent   []Continuous Lbs:  [] before manual  [] after manual  []w/heat     []  Ultrasound: []Continuous   [] Pulsed at:                            []1MHz   []3MHz Location:  W/cm2:     []  Paraffin         Location:  []w/heat   10 []  Ice     [x]  Heat  []  Ice massage Position: supine  Location: neck, back     []  Laser  []  Other: Position:  Location:     []  Vasopneumatic Device Pressure:       [] lo [] med [] hi   Temperature:    [x] Skin assessment post-treatment:  [x]intact []redness- no adverse reaction    []redness - adverse reaction:         25 min Neuromuscular Re-education:  [x]  See flow sheet :   Rationale: increase ROM, increase strength, and improve coordination  to improve the patients ability to perform home management tasks without pain     15   min Dry Needling without E-Stim         (not to be included in total timed codes or 1:1 Treatment Time)     min Dry Needling with E-Stim: Attended      Type:       Muscles:     min Dry Needling with E-Stim: Unattended      Type:       Muscles:        Billing:  [x]  87911 (1-2 muscles)         []  90026 (3+ muscles)           [x]  Script obtained from MD specifying \"Dry Needling\"    Written Informed Consent Obtained and Document Included in Chart: YES    Procedure: Intramsuclar Dry Needling was done with a .30 mm  gauge solid monofilament needle,                       under aseptic technique      Rationale/Necessity: Decrease pain, Increase ROM, Increase/Improve Function, Increase Tissue Extensibility, Reduce Trigger Points, Reduce Spasm, Restore Muscle Balance, Reduce Headaches, and Reduce TMJ/TMD    Muscles Treated:  [x]  Bilateral: Splenius cervicis, upper trapezius                                 []  Right:                                  []  Left:                            [x]  Hemostasis applied after each needle application     Response: Localized Twitch Response, Increased ROM, Increased Tolerance to Exercise/Activity, Increased Motor Recruitment, Improve Soft Tissue Mobility, Reduced Tightness, Reduced Headache, and Post Needle Soreness    Education: Purpose or rationale of dry needling                      Side effects and possible adverse effects of the treatment                      The need to report the use of blood thinners and/or immunosupressant medications                      How to respond to possible adverse effects of the treatment                       Self-treatment of post needling soreness (ice/heat, stretching, activity modification)                      Opportunity was given to ask questions; all questions were answered     With   [] TE   [] TA   [] Neuro   [] SC   [] other: Patient Education: [x] Review HEP    [] Progressed/Changed HEP based on:   [] positioning   [] body mechanics   [] transfers   [] heat/ice application    [] other:      Other Objective/Functional Measures:       Pain Level (0-10 scale) post treatment: 1/10    ASSESSMENT/Changes in Function:   Patient will continue to benefit from skilled PT services to modify and progress therapeutic interventions, address functional mobility deficits, address ROM deficits, address strength deficits, analyze and address soft tissue restrictions, analyze and cue movement patterns, analyze and modify body mechanics/ergonomics, assess and modify postural abnormalities, and address imbalance/dizziness to attain remaining goals. []  See Plan of Care  []  See progress note/recertification  []  See Discharge Summary         Progress towards goals / Updated goals:  Excellent progress in the past week with DN and will continue at 2x/week for another 2 weeks. PLAN  [x]  Upgrade activities as tolerated     [x]  Continue plan of care  []  Update interventions per flow sheet       []  Discharge due to:_  []  Other:_      Franklin Finch, PT , DPT, OCS, Cert.  DN    11/1/2022

## 2022-11-02 ENCOUNTER — APPOINTMENT (OUTPATIENT)
Dept: PHYSICAL THERAPY | Age: 37
End: 2022-11-02
Payer: MEDICAID

## 2022-11-04 ENCOUNTER — HOSPITAL ENCOUNTER (OUTPATIENT)
Dept: PHYSICAL THERAPY | Age: 37
Discharge: HOME | End: 2022-11-04
Payer: MEDICAID

## 2022-11-04 PROCEDURE — 20560 NDL INSJ W/O NJX 1 OR 2 MUSC: CPT | Performed by: PHYSICAL THERAPIST

## 2022-11-04 PROCEDURE — 97112 NEUROMUSCULAR REEDUCATION: CPT | Performed by: PHYSICAL THERAPIST

## 2022-11-04 NOTE — PROGRESS NOTES
PT DAILY TREATMENT NOTE 2-15    Patient Name: Arleen Dunaway  Date:2022  : 1985  [x]  Patient  Verified  Payor: [address] / Plan:  [facility] / Product Type: Managed Care Medicaid /    In time: 9:15 AM Out time:10:05 AM  Total Treatment Time (min): 50  Visit #:  10    Treatment Area: Neck pain [M54.2]    SUBJECTIVE  Pain Level (0-10 scale): 1/10  Any medication changes, allergies to medications, adverse drug reactions, diagnosis change, or new procedure performed?: [x] No    [] Yes (see summary sheet for update)  Subjective functional status/changes:   [] No changes reported  Patient reports feeling much better overall since starting the DN and has less tightness through B UT.    OBJECTIVE            Modality rationale: decrease pain and increase tissue extensibility to improve the patients ability to perform home management tasks without pain    Min Type Additional Details     [] Estim: []Att   []Unatt        []TENS instruct                  []IFC  []Premod   []NMES                     []Other:  []w/US   []w/ice   []w/heat  Position:  Location:     []  Traction: [] Cervical       []Lumbar                       [] Prone          []Supine                       []Intermittent   []Continuous Lbs:  [] before manual  [] after manual  []w/heat     []  Ultrasound: []Continuous   [] Pulsed at:                            []1MHz   []3MHz Location:  W/cm2:     []  Paraffin         Location:  []w/heat   10 []  Ice     [x]  Heat  []  Ice massage Position: supine  Location: neck, back     []  Laser  []  Other: Position:  Location:     []  Vasopneumatic Device Pressure:       [] lo [] med [] hi   Temperature:    [x] Skin assessment post-treatment:  [x]intact []redness- no adverse reaction    []redness - adverse reaction:         25 min Neuromuscular Re-education:  [x]  See flow sheet :   Rationale: increase ROM, increase strength, and improve coordination  to improve the patients ability to perform home management tasks without pain     15   min Dry Needling without E-Stim         (not to be included in total timed codes or 1:1 Treatment Time)     min Dry Needling with E-Stim: Attended      Type:       Muscles:     min Dry Needling with E-Stim: Unattended      Type:       Muscles:        Billing:  [x]  52446 (1-2 muscles)         []  51617 (3+ muscles)           [x]  Script obtained from MD specifying \"Dry Needling\"    Written Informed Consent Obtained and Document Included in Chart: YES    Procedure: Intramsuclar Dry Needling was done with a .30 mm  gauge solid monofilament needle,                       under aseptic technique      Rationale/Necessity: Decrease pain, Increase ROM, Increase/Improve Function, Increase Tissue Extensibility, Reduce Trigger Points, Reduce Spasm, Restore Muscle Balance, Reduce Headaches, and Reduce TMJ/TMD    Muscles Treated:  [x]  Bilateral: Splenius cervicis, upper trapezius                                 []  Right:                                  []  Left:                            [x]  Hemostasis applied after each needle application     Response: Localized Twitch Response, Increased ROM, Increased Tolerance to Exercise/Activity, Increased Motor Recruitment, Improve Soft Tissue Mobility, Reduced Tightness, Reduced Headache, and Post Needle Soreness    Education: Purpose or rationale of dry needling                      Side effects and possible adverse effects of the treatment                      The need to report the use of blood thinners and/or immunosupressant medications                      How to respond to possible adverse effects of the treatment                       Self-treatment of post needling soreness (ice/heat, stretching, activity modification)                      Opportunity was given to ask questions; all questions were answered     With   [] TE   [] TA   [] Neuro   [] SC   [] other: Patient Education: [x] Review HEP    [] Progressed/Changed HEP based on:   [] positioning   [] body mechanics   [] transfers   [] heat/ice application    [] other:      Other Objective/Functional Measures:       Pain Level (0-10 scale) post treatment: 1/10    ASSESSMENT/Changes in Function:   Patient will continue to benefit from skilled PT services to modify and progress therapeutic interventions, address functional mobility deficits, address ROM deficits, address strength deficits, analyze and address soft tissue restrictions, analyze and cue movement patterns, analyze and modify body mechanics/ergonomics, assess and modify postural abnormalities, and address imbalance/dizziness to attain remaining goals. []  See Plan of Care  []  See progress note/recertification  []  See Discharge Summary         Progress towards goals / Updated goals:  Continued steady progress with DN. Will likely decrease to 1x/week after next week's visits. PLAN  [x]  Upgrade activities as tolerated     [x]  Continue plan of care  []  Update interventions per flow sheet       []  Discharge due to:_  []  Other:_      Aquiles Caal, PT , DPT, OCS, Cert.  DN    11/4/2022

## 2022-11-09 ENCOUNTER — HOSPITAL ENCOUNTER (OUTPATIENT)
Dept: PHYSICAL THERAPY | Age: 37
Discharge: HOME | End: 2022-11-09
Payer: MEDICAID

## 2022-11-09 PROCEDURE — 97112 NEUROMUSCULAR REEDUCATION: CPT | Performed by: PHYSICAL MEDICINE & REHABILITATION

## 2022-11-09 NOTE — PROGRESS NOTES
PT DAILY TREATMENT NOTE 2-15    Patient Name: Ronnell Stallworth  Date:2022  : 1985  [x]  Patient  Verified  Payor: [address] / Plan:  [facility] / Product Type: Managed Care Medicaid /    In time: 9:25 AM Out time:10:00 AM  Total Treatment Time (min): 35  Visit #:  11    Treatment Area: Neck pain [M54.2]    SUBJECTIVE  Pain Level (0-10 scale): 1/10  Any medication changes, allergies to medications, adverse drug reactions, diagnosis change, or new procedure performed?: [x] No    [] Yes (see summary sheet for update)  Subjective functional status/changes:   [] No changes reported  Patient reports she continues to do well overall and the gym is going well.     OBJECTIVE            Modality rationale: decrease pain and increase tissue extensibility to improve the patients ability to perform home management tasks without pain    Min Type Additional Details     [] Estim: []Att   []Unatt        []TENS instruct                  []IFC  []Premod   []NMES                     []Other:  []w/US   []w/ice   []w/heat  Position:  Location:     []  Traction: [] Cervical       []Lumbar                       [] Prone          []Supine                       []Intermittent   []Continuous Lbs:  [] before manual  [] after manual  []w/heat     []  Ultrasound: []Continuous   [] Pulsed at:                            []1MHz   []3MHz Location:  W/cm2:     []  Paraffin         Location:  []w/heat   10 []  Ice     [x]  Heat  []  Ice massage Position: supine  Location: neck, back     []  Laser  []  Other: Position:  Location:     []  Vasopneumatic Device Pressure:       [] lo [] med [] hi   Temperature:    [x] Skin assessment post-treatment:  [x]intact []redness- no adverse reaction    []redness - adverse reaction:         25 min Neuromuscular Re-education:  [x]  See flow sheet :   Rationale: increase ROM, increase strength, and improve coordination  to improve the patients ability to perform home management tasks without pain     With   [] TE   [] TA   [] Neuro   [] SC   [] other: Patient Education: [x] Review HEP    [] Progressed/Changed HEP based on:   [] positioning   [] body mechanics   [] transfers   [] heat/ice application    [] other:      Other Objective/Functional Measures: Mod to max difficulty with standing wall reach but was able to perform with use of mirror for feedback. Pain Level (0-10 scale) post treatment: 1/10    ASSESSMENT/Changes in Function:   Patient will continue to benefit from skilled PT services to modify and progress therapeutic interventions, address functional mobility deficits, address ROM deficits, address strength deficits, analyze and address soft tissue restrictions, analyze and cue movement patterns, analyze and modify body mechanics/ergonomics, assess and modify postural abnormalities, and address imbalance/dizziness to attain remaining goals. []  See Plan of Care  []  See progress note/recertification  []  See Discharge Summary         Progress towards goals / Updated goals:  Continued steady progress with DN.  Will likely decrease to 1x/week and focus on DN and integration to HEP and gym program    PLAN  [x]  Upgrade activities as tolerated     [x]  Continue plan of care  []  Update interventions per flow sheet       []  Discharge due to:_  []  Other:_      [place] [facility] , PTA, OPTA, CPT   11/9/2022

## 2022-11-11 ENCOUNTER — HOSPITAL ENCOUNTER (OUTPATIENT)
Dept: PHYSICAL THERAPY | Age: 37
Discharge: HOME | End: 2022-11-11
Payer: MEDICAID

## 2022-11-11 PROCEDURE — 20560 NDL INSJ W/O NJX 1 OR 2 MUSC: CPT | Performed by: PHYSICAL THERAPIST

## 2022-11-11 NOTE — PROGRESS NOTES
PT DAILY TREATMENT NOTE 2-15    Patient Name: Paul Powell  Date:2022  : 1985  [x]  Patient  Verified  Payor: [address] / Plan: [facility] / Product Type: Managed Care Medicaid /    In time: 9:25 AM Out time:9:50 AM  Total Treatment Time (min): 25  Visit #:  12    Treatment Area: Neck pain [M54.2]    SUBJECTIVE  Pain Level (0-10 scale): 1/10  Any medication changes, allergies to medications, adverse drug reactions, diagnosis change, or new procedure performed?: [x] No    [] Yes (see summary sheet for update)  Subjective functional status/changes:   [] No changes reported  Patient saw the PM&R physician and she referred her to a  for the EDS diagnosis. The physician felt like she did have EDS, but wanted a formal diagnosis from the other office.      OBJECTIVE            Modality rationale: Patient declined    Min Type Additional Details     [] Estim: []Att   []Unatt        []TENS instruct                  []IFC  []Premod   []NMES                     []Other:  []w/US   []w/ice   []w/heat  Position:  Location:     []  Traction: [] Cervical       []Lumbar                       [] Prone          []Supine                       []Intermittent   []Continuous Lbs:  [] before manual  [] after manual  []w/heat     []  Ultrasound: []Continuous   [] Pulsed at:                            []1MHz   []3MHz Location:  W/cm2:     []  Paraffin         Location:  []w/heat    []  Ice     []  Heat  []  Ice massage Position: supine  Location: neck, back     []  Laser  []  Other: Position:  Location:     []  Vasopneumatic Device Pressure:       [] lo [] med [] hi   Temperature:    [x] Skin assessment post-treatment:  [x]intact []redness- no adverse reaction    []redness - adverse reaction:         25 min Neuromuscular Re-education:  [x]  See flow sheet :   Rationale: increase ROM, increase strength, and improve coordination  to improve the patients ability to perform home management tasks without pain     With   [] TE   [] TA   [] Neuro   [] SC   [] other: Patient Education: [x] Review HEP    [] Progressed/Changed HEP based on:   [] positioning   [] body mechanics   [] transfers   [] heat/ice application    [] other:      Other Objective/Functional Measures:       Pain Level (0-10 scale) post treatment: 1/10    ASSESSMENT/Changes in Function:   Patient will continue to benefit from skilled PT services to modify and progress therapeutic interventions, address functional mobility deficits, address ROM deficits, address strength deficits, analyze and address soft tissue restrictions, analyze and cue movement patterns, analyze and modify body mechanics/ergonomics, assess and modify postural abnormalities, and address imbalance/dizziness to attain remaining goals. []  See Plan of Care  []  See progress note/recertification  []  See Discharge Summary         Progress towards goals / Updated goals:  Continued steady progress with DN. Will likely decrease to 1x/week and focus on DN and integration to HEP and gym program    PLAN  [x]  Upgrade activities as tolerated     [x]  Continue plan of care  []  Update interventions per flow sheet       []  Discharge due to:_  []  Other:_      Subha Mckeon, PT , DPT, OCS, Cert.  DN   11/11/2022

## 2023-01-04 ENCOUNTER — TELEPHONE (OUTPATIENT)
Dept: FAMILY MEDICINE CLINIC | Age: 38
End: 2023-01-04

## 2023-01-04 NOTE — TELEPHONE ENCOUNTER
Pt was inquiring about a refill of   butalb/acetaminophen/caffeine -40 mg  I do see where it was denied but unsure if pt needs a follow up appt or what her next steps should be  [phone]

## 2023-01-05 ENCOUNTER — PATIENT MESSAGE (OUTPATIENT)
Dept: FAMILY MEDICINE CLINIC | Age: 38
End: 2023-01-05

## 2023-01-05 ENCOUNTER — TELEPHONE (OUTPATIENT)
Dept: FAMILY MEDICINE CLINIC | Age: 38
End: 2023-01-05

## 2023-01-05 RX ORDER — BUTALBITAL, ACETAMINOPHEN AND CAFFEINE 50; 325; 40 MG/1; MG/1; MG/1
1 TABLET ORAL
Qty: 28 TABLET | Refills: 0 | Status: SHIPPED | OUTPATIENT
Start: 2023-01-05 | End: 2023-01-06 | Stop reason: SDUPTHER

## 2023-01-05 NOTE — TELEPHONE ENCOUNTER
Called patient she was wanting to know if Dr. Kenny Loyola would call in enough just to get her through until she sees neuro in Feb. She stated she has tried several medications and when they get as bad as they have ( she states her migraines have been debilitating ) and it has been the only thing that works and then she will started getting it from neuro in feb

## 2023-01-05 NOTE — TELEPHONE ENCOUNTER
----- Message from [employer] sent at 1/5/2023 10:24 AM EST -----  Regarding: FW: Neck/head pain medicine     ----- Message -----  From: Lily Burris  Sent: 1/5/2023  10:04 AM EST  To: [facility] Nurse Pool  Subject: Neck/head pain medicine                          Karina Rogers,  Can you please call in a prescription for some type of medication that can help with pain in my face/head/neck? I used the Butalbital last year and that seemed to work. I'm supposed to start pituitary radiation treatment daily for six weeks and I'm concerned about being able to lie still with this amount of pain. My pharmacy is [facility] at [facility].  Thank you

## 2023-01-05 NOTE — TELEPHONE ENCOUNTER
To: [facility] Nurse [place]  Subject: Neck/head pain medicine                           Hello Dr. Wilkes [place],  Can you please call in a prescription for some type of medication that can help with pain in my face/head/neck? I used the Butalbital last year and that seemed to work. I'm supposed to start pituitary radiation treatment daily for six weeks and I'm concerned about being able to lie still with this amount of pain. My pharmacy is [facility] at [facility].  Thank you

## 2023-01-06 ENCOUNTER — TELEPHONE (OUTPATIENT)
Dept: FAMILY MEDICINE CLINIC | Age: 38
End: 2023-01-06

## 2023-01-06 RX ORDER — BUTALBITAL, ACETAMINOPHEN AND CAFFEINE 50; 325; 40 MG/1; MG/1; MG/1
1 TABLET ORAL
Qty: 28 TABLET | Refills: 0 | Status: SHIPPED | OUTPATIENT
Start: 2023-01-06

## 2023-01-06 NOTE — TELEPHONE ENCOUNTER
butalbital-acetaminophen-caffeine (FIORICET, ESGIC) -40 mg per tablet 28 Tablet 0 1/5/2023     Sig - Route:  Take 1 Tablet by mouth every four (4) hours as needed for Headache. - Oral    Class: Print      Pt called to confirm med was sent to pharmacy but not sent over states printed    Send to [address] [facility] in [facility]    Questions call pt at [phone]

## 2023-02-09 ENCOUNTER — OFFICE VISIT (OUTPATIENT)
Dept: NEUROLOGY | Age: 38
End: 2023-02-09
Payer: MEDICAID

## 2023-02-09 VITALS
HEART RATE: 64 BPM | SYSTOLIC BLOOD PRESSURE: 110 MMHG | OXYGEN SATURATION: 97 % | WEIGHT: 134 LBS | TEMPERATURE: 97.2 F | DIASTOLIC BLOOD PRESSURE: 70 MMHG | BODY MASS INDEX: 21.63 KG/M2 | RESPIRATION RATE: 13 BRPM

## 2023-02-09 DIAGNOSIS — R51.9 INTRACTABLE HEADACHE, UNSPECIFIED CHRONICITY PATTERN, UNSPECIFIED HEADACHE TYPE: Primary | ICD-10-CM

## 2023-02-09 DIAGNOSIS — G43.919 INTRACTABLE MIGRAINE WITHOUT STATUS MIGRAINOSUS, UNSPECIFIED MIGRAINE TYPE: ICD-10-CM

## 2023-02-09 RX ORDER — CYCLOBENZAPRINE HCL 10 MG
TABLET ORAL
COMMUNITY
Start: 2023-02-07

## 2023-02-09 RX ORDER — RIMEGEPANT SULFATE 75 MG/75MG
75 TABLET, ORALLY DISINTEGRATING ORAL
COMMUNITY

## 2023-02-09 RX ORDER — RIMEGEPANT SULFATE 75 MG/75MG
TABLET, ORALLY DISINTEGRATING ORAL
Qty: 16 TABLET | Refills: 5 | Status: SHIPPED | OUTPATIENT
Start: 2023-02-09

## 2023-02-09 NOTE — PROGRESS NOTES
pituitary tumor, having HA  Things have been going well  Still having pain in head and neck  Hasn't started radiation  Butalbital isn't covered by insurance, nurtec typically helps but not all the time  Said to have 8-9 monthly

## 2023-02-10 NOTE — PROGRESS NOTES
Rom Marshall is a 40 y.o. female who presents with the following  Chief Complaint   Patient presents with    Follow-up       HPI    FU headaches, neck pain   Taking Nurtec every other day is an option   Hx of pituitary tumor, having HA    Things have been going well overall though   Still having pain in head and neck  Hasn't started radiation- not sure if will yet. Butalbital isn't covered by insurance, nurtec typically helps but not all the time  Said to have 8-9 monthly  To see NSGY and [facility] also         Allergies   Allergen Reactions    Keflex [Cephalexin] Other (comments)     Mini seizure. Current Outpatient Medications   Medication Sig    rimegepant (Nurtec ODT) 75 mg disintegrating tablet Take 75 mg by mouth once as needed for Migraine. cyclobenzaprine (FLEXERIL) 10 mg tablet     rimegepant (Nurtec ODT) 75 mg disintegrating tablet 1 tablet by mouth every other day for migraine prevention    butalbital-acetaminophen-caffeine (FIORICET, ESGIC) -40 mg per tablet Take 1 Tablet by mouth every four (4) hours as needed for Headache. cabergoline (DOSTINEX) 0.5 mg tablet Take 0.5 mg by mouth two (2) times a week. No current facility-administered medications for this visit.        Social History     Tobacco Use   Smoking Status Former    Types: Cigarettes    Quit date: 2/15/2006    Years since quittin.9   Smokeless Tobacco Never       Past Medical History:   Diagnosis Date    Anxiety     Eating disorder, anorexia nervosa 2006    Has been hospitalized for this in the past    IBS (irritable bowel syndrome)     Kidney stones     Migraine     Opiate addiction ([facility] [address] 2015    Per [facility] - Went to pain clinic    PTSD (post-traumatic stress disorder) 2015    Dx at [facility] 2015       Past Surgical History:   Procedure Laterality Date    HX LEEP PROCEDURE  2015    precancerous cells    HX TONSIL AND ADENOIDECTOMY      HX WISDOM TEETH EXTRACTION         Family History   Problem Relation Age of Onset    Psychiatric Disorder Mother     Elevated Lipids Mother     Psychiatric Disorder Sister     SLE Paternal Grandmother        Social History     Socioeconomic History    Marital status:    Tobacco Use    Smoking status: Former     Types: Cigarettes     Quit date: 2/15/2006     Years since quittin.9    Smokeless tobacco: Never   Vaping Use    Vaping Use: Never used   Substance and Sexual Activity    Alcohol use: No     Alcohol/week: 0.0 standard drinks    Drug use: No     Comment: Has used marijuana in the past    Sexual activity: Yes     Partners: Male     Birth control/protection: None       Review of Systems   Eyes:  Positive for blurred vision and photophobia. Negative for double vision. Respiratory:  Negative for shortness of breath and wheezing. Cardiovascular:  Negative for chest pain and palpitations. Gastrointestinal:  Negative for nausea and vomiting. Neurological:  Positive for headaches. Negative for dizziness, seizures and loss of consciousness. Remainder of comprehensive review is negative. Physical Exam :    Visit Vitals  /70 (BP 1 Location: Left upper arm, BP Patient Position: Sitting, BP Cuff Size: Adult)   Pulse 64   Temp 97.2 °F (36.2 °C)   Resp 13   Wt 60.8 kg (134 lb)   SpO2 97%   BMI 21.63 kg/m²       General: Well defined, nourished, and groomed individual in no acute distress. Musculoskeletal: Extremities revealed no edema and had full range of motion of joints. Psych: Good mood and bright affect    NEUROLOGICAL EXAMINATION:    Mental Status: Alert and oriented to person, place, and time    Cranial Nerves:    II, III, IV, VI: Visual acuity grossly intact. Visual fields are normal.    Pupils are equal, round, and reactive to light and accommodation. Extra-ocular movements are full and fluid. Fundoscopic exam was benign, no ptosis or nystagmus. V-XII: Hearing is grossly intact. Facial features are symmetric, with normal sensation and strength. The palate rises symmetrically and the tongue protrudes midline. Sternocleidomastoids 5/5. Motor Examination: Normal tone, bulk, and strength, 5/5 muscle strength throughout. Coordination: Finger to nose was normal. No resting or intention tremor    Gait and Station: Steady while walking. Normal arm swing. No pronator drift. No muscle wasting or fasiculations noted. Reflexes: DTRs 2+ throughout. Results for orders placed or performed in visit on 68/63/04   METABOLIC PANEL, COMPREHENSIVE   Result Value Ref Range    Glucose 75 65 - 99 mg/dL    BUN 6 6 - 20 mg/dL    Creatinine 0.63 0.57 - 1.00 mg/dL    GFR est non- >59 mL/min/1.73    GFR est  >59 mL/min/1.73    BUN/Creatinine ratio 10 9 - 23    Sodium 141 134 - 144 mmol/L    Potassium 3.8 3.5 - 5.2 mmol/L    Chloride 101 96 - 106 mmol/L    CO2 25 20 - 29 mmol/L    Calcium 9.6 8.7 - 10.2 mg/dL    Protein, total 7.5 6.0 - 8.5 g/dL    Albumin 4.8 3.8 - 4.8 g/dL    GLOBULIN, TOTAL 2.7 1.5 - 4.5 g/dL    A-G Ratio 1.8 1.2 - 2.2    Bilirubin, total 0.3 0.0 - 1.2 mg/dL    Alk.  phosphatase 75 39 - 117 IU/L    AST (SGOT) 22 0 - 40 IU/L    ALT (SGPT) 18 0 - 32 IU/L   CBC W/O DIFF   Result Value Ref Range    WBC 5.0 3.4 - 10.8 x10E3/uL    RBC 4.16 3.77 - 5.28 x10E6/uL    HGB 12.6 11.1 - 15.9 g/dL    HCT 35.5 34.0 - 46.6 %    MCV 85 79 - 97 fL    MCH 30.3 26.6 - 33.0 pg    MCHC 35.5 31.5 - 35.7 g/dL    RDW 12.4 11.7 - 15.4 %    PLATELET 427 054 - 458 x10E3/uL   HIV 1/2 AG/AB, 4TH GENERATION,W RFLX CONFIRM   Result Value Ref Range    HIV SCREEN 4TH GENERATION WRFX Non Reactive Non Reactive   HEPATITIS C AB   Result Value Ref Range    Hep C Virus Ab <0.1 0.0 - 0.9 s/co ratio   THYROID CASCADE PROFILE   Result Value Ref Range    TSH 1.350 0.450 - 4.500 uIU/mL   RPR   Result Value Ref Range    RPR Non Reactive Non Reactive       Orders Placed This Encounter    rimegepant (Nurtec ODT) 75 mg disintegrating tablet     Sig: Take 75 mg by mouth once as needed for Migraine. cyclobenzaprine (FLEXERIL) 10 mg tablet    rimegepant (Nurtec ODT) 75 mg disintegrating tablet     Si tablet by mouth every other day for migraine prevention     Dispense:  16 Tablet     Refill:  5       1. Intractable headache, unspecified chronicity pattern, unspecified headache type    2. Intractable migraine without status migrainosus, unspecified migraine type      Use Nurtec every other day for prevention   This helps for PRN rescue and can help prevention   She is following with other specialites. To get with neurology at [facility] later this year.    Use Fioricet PRN   Keep with NSGY               This note will not be viewable in [address]